# Patient Record
Sex: FEMALE | Race: WHITE | NOT HISPANIC OR LATINO | Employment: OTHER | URBAN - METROPOLITAN AREA
[De-identification: names, ages, dates, MRNs, and addresses within clinical notes are randomized per-mention and may not be internally consistent; named-entity substitution may affect disease eponyms.]

---

## 2023-03-18 PROBLEM — M79.89 SWELLING OF BOTH HANDS: Status: ACTIVE | Noted: 2023-03-18

## 2023-03-18 PROBLEM — R93.89 ABNORMAL FINDINGS ON DIAGNOSTIC IMAGING OF OTHER SPECIFIED BODY STRUCTURES: Status: ACTIVE | Noted: 2023-03-18

## 2023-03-18 PROBLEM — R93.1 AGATSTON CORONARY ARTERY CALCIUM SCORE BETWEEN 200 AND 399: Status: ACTIVE | Noted: 2023-03-18

## 2023-03-18 PROBLEM — E78.5 HYPERLIPIDEMIA: Status: ACTIVE | Noted: 2023-03-18

## 2023-03-18 PROBLEM — R35.0 URINARY FREQUENCY: Status: ACTIVE | Noted: 2023-03-18

## 2023-03-18 PROBLEM — M94.9 DISORDER OF BONE AND CARTILAGE: Status: ACTIVE | Noted: 2023-03-18

## 2023-03-18 PROBLEM — H04.129 DRY EYE: Status: ACTIVE | Noted: 2023-03-18

## 2023-03-18 PROBLEM — R09.89 FEMORAL BRUIT: Status: ACTIVE | Noted: 2023-03-18

## 2023-03-18 PROBLEM — E78.89 ELEVATED HIGH-DENSITY LIPOPROTEIN: Status: ACTIVE | Noted: 2023-03-18

## 2023-03-18 PROBLEM — R93.89 ABNORMAL CHEST X-RAY: Status: ACTIVE | Noted: 2023-03-18

## 2023-03-18 PROBLEM — G56.00 CARPAL TUNNEL SYNDROME: Status: ACTIVE | Noted: 2023-03-18

## 2023-03-18 PROBLEM — M89.9 DISORDER OF BONE AND CARTILAGE: Status: ACTIVE | Noted: 2023-03-18

## 2023-03-18 PROBLEM — M19.90 OSTEOARTHRITIS: Status: ACTIVE | Noted: 2023-03-18

## 2023-03-18 PROBLEM — R09.89 BILATERAL CAROTID BRUITS: Status: ACTIVE | Noted: 2023-03-18

## 2023-03-18 PROBLEM — R09.89 PALPABLE ABDOMINAL AORTA: Status: ACTIVE | Noted: 2023-03-18

## 2023-03-18 PROBLEM — H43.399 VITREOUS FLOATERS: Status: ACTIVE | Noted: 2023-03-18

## 2023-03-18 PROBLEM — M79.10 MYALGIA: Status: ACTIVE | Noted: 2023-03-18

## 2023-03-18 PROBLEM — G47.419 NARCOLEPSY (HHS-HCC): Status: ACTIVE | Noted: 2023-03-18

## 2023-03-18 PROBLEM — M81.0 OSTEOPOROSIS: Status: ACTIVE | Noted: 2023-03-18

## 2023-03-18 PROBLEM — H43.813 POSTERIOR VITREOUS DETACHMENT OF BOTH EYES: Status: ACTIVE | Noted: 2023-03-18

## 2023-03-18 PROBLEM — Z97.3 WEARS EYEGLASSES: Status: ACTIVE | Noted: 2023-03-18

## 2023-03-18 PROBLEM — H26.9 CATARACT: Status: ACTIVE | Noted: 2023-03-18

## 2023-03-18 PROBLEM — H25.813 MIXED TYPE AGE-RELATED CATARACT, BOTH EYES: Status: ACTIVE | Noted: 2023-03-18

## 2023-03-18 PROBLEM — M35.3 PMR (POLYMYALGIA RHEUMATICA) (MULTI): Status: ACTIVE | Noted: 2023-03-18

## 2023-03-18 PROBLEM — R09.1 PLEURISY: Status: ACTIVE | Noted: 2023-03-18

## 2023-03-18 PROBLEM — R92.8 ABNORMAL MAMMOGRAM: Status: ACTIVE | Noted: 2023-03-18

## 2023-03-18 PROBLEM — L25.9 CONTACT DERMATITIS: Status: ACTIVE | Noted: 2023-03-18

## 2023-03-18 PROBLEM — E67.3 HIGH VITAMIN D LEVEL: Status: ACTIVE | Noted: 2023-03-18

## 2023-03-18 PROBLEM — R09.89 LABILE BLOOD PRESSURE: Status: ACTIVE | Noted: 2023-03-18

## 2023-03-18 PROBLEM — I25.10 CORONARY ARTERY DISEASE WITHOUT ANGINA PECTORIS: Status: ACTIVE | Noted: 2023-03-18

## 2023-03-18 RX ORDER — ASCORBIC ACID 125 MG
100 TABLET,CHEWABLE ORAL
COMMUNITY
Start: 2021-03-19

## 2023-03-18 RX ORDER — EPINEPHRINE 0.22MG
AEROSOL WITH ADAPTER (ML) INHALATION
COMMUNITY
Start: 2014-10-23 | End: 2023-03-25 | Stop reason: DRUGHIGH

## 2023-03-18 RX ORDER — PREDNISONE 5 MG/1
1 TABLET ORAL DAILY
COMMUNITY
Start: 2022-07-18 | End: 2023-03-25 | Stop reason: DRUGHIGH

## 2023-03-18 RX ORDER — TETRACYCLINE HCL 500 MG
CAPSULE ORAL
COMMUNITY
Start: 2021-03-19

## 2023-03-18 RX ORDER — AMPICILLIN TRIHYDRATE 250 MG
CAPSULE ORAL
COMMUNITY
Start: 2021-03-19

## 2023-03-18 RX ORDER — VIT C/E/ZN/COPPR/LUTEIN/ZEAXAN 250MG-90MG
25 CAPSULE ORAL
COMMUNITY
Start: 2014-05-08 | End: 2023-03-25 | Stop reason: DRUGHIGH

## 2023-03-18 RX ORDER — METHYLPHENIDATE HYDROCHLORIDE 20 MG/1
1 TABLET ORAL 3 TIMES DAILY
COMMUNITY

## 2023-03-18 RX ORDER — IBUPROFEN 100 MG/5ML
1 SUSPENSION, ORAL (FINAL DOSE FORM) ORAL DAILY
COMMUNITY
Start: 2021-03-19

## 2023-03-18 RX ORDER — PREDNISONE 1 MG/1
3 TABLET ORAL DAILY
COMMUNITY
Start: 2021-06-18 | End: 2023-03-25 | Stop reason: DRUGHIGH

## 2023-03-18 RX ORDER — SODIUM OXYBATE 0.5 G/ML
SOLUTION ORAL
COMMUNITY
Start: 2013-11-01 | End: 2023-03-25 | Stop reason: DRUGHIGH

## 2023-03-18 RX ORDER — IBANDRONATE SODIUM 150 MG/1
1 TABLET, FILM COATED ORAL
COMMUNITY
Start: 2022-09-16 | End: 2024-02-26 | Stop reason: SDUPTHER

## 2023-03-18 RX ORDER — CALCIUM CARBONATE 600 MG
1 TABLET ORAL DAILY
COMMUNITY
Start: 2014-10-23

## 2023-03-23 ENCOUNTER — OFFICE VISIT (OUTPATIENT)
Dept: PRIMARY CARE | Facility: CLINIC | Age: 73
End: 2023-03-23
Payer: MEDICARE

## 2023-03-23 VITALS
SYSTOLIC BLOOD PRESSURE: 160 MMHG | DIASTOLIC BLOOD PRESSURE: 70 MMHG | RESPIRATION RATE: 14 BRPM | BODY MASS INDEX: 21.53 KG/M2 | HEIGHT: 66 IN | WEIGHT: 134 LBS | HEART RATE: 66 BPM

## 2023-03-23 DIAGNOSIS — R09.89 PALPABLE ABDOMINAL AORTA: ICD-10-CM

## 2023-03-23 DIAGNOSIS — R35.0 URINARY FREQUENCY: ICD-10-CM

## 2023-03-23 DIAGNOSIS — E78.89 ELEVATED HIGH-DENSITY LIPOPROTEIN: ICD-10-CM

## 2023-03-23 DIAGNOSIS — M81.0 OSTEOPOROSIS, UNSPECIFIED OSTEOPOROSIS TYPE, UNSPECIFIED PATHOLOGICAL FRACTURE PRESENCE: ICD-10-CM

## 2023-03-23 DIAGNOSIS — L25.9 CONTACT DERMATITIS, UNSPECIFIED CONTACT DERMATITIS TYPE, UNSPECIFIED TRIGGER: ICD-10-CM

## 2023-03-23 DIAGNOSIS — Z00.00 ROUTINE GENERAL MEDICAL EXAMINATION AT A HEALTH CARE FACILITY: ICD-10-CM

## 2023-03-23 DIAGNOSIS — R09.89 BILATERAL CAROTID BRUITS: ICD-10-CM

## 2023-03-23 DIAGNOSIS — I10 SYSTOLIC HYPERTENSION, ISOLATED: ICD-10-CM

## 2023-03-23 DIAGNOSIS — M35.3 PMR (POLYMYALGIA RHEUMATICA) (MULTI): ICD-10-CM

## 2023-03-23 DIAGNOSIS — R09.89 FEMORAL BRUIT: ICD-10-CM

## 2023-03-23 DIAGNOSIS — E78.5 HYPERLIPIDEMIA, UNSPECIFIED HYPERLIPIDEMIA TYPE: ICD-10-CM

## 2023-03-23 DIAGNOSIS — M19.91 PRIMARY OSTEOARTHRITIS, UNSPECIFIED SITE: ICD-10-CM

## 2023-03-23 DIAGNOSIS — G47.419 PRIMARY NARCOLEPSY WITHOUT CATAPLEXY (HHS-HCC): ICD-10-CM

## 2023-03-23 DIAGNOSIS — I25.10 CORONARY ARTERY DISEASE INVOLVING NATIVE CORONARY ARTERY OF NATIVE HEART WITHOUT ANGINA PECTORIS: Primary | ICD-10-CM

## 2023-03-23 LAB
ALANINE AMINOTRANSFERASE (SGPT) (U/L) IN SER/PLAS: 14 U/L (ref 7–45)
ALBUMIN (G/DL) IN SER/PLAS: 4.4 G/DL (ref 3.4–5)
ANION GAP IN SER/PLAS: 14 MMOL/L (ref 10–20)
ASPARTATE AMINOTRANSFERASE (SGOT) (U/L) IN SER/PLAS: 16 U/L (ref 9–39)
CALCIUM (MG/DL) IN SER/PLAS: 10.3 MG/DL (ref 8.6–10.6)
CARBON DIOXIDE, TOTAL (MMOL/L) IN SER/PLAS: 31 MMOL/L (ref 21–32)
CHLORIDE (MMOL/L) IN SER/PLAS: 101 MMOL/L (ref 98–107)
CHOLESTEROL (MG/DL) IN SER/PLAS: 245 MG/DL (ref 0–199)
CHOLESTEROL IN HDL (MG/DL) IN SER/PLAS: 94.5 MG/DL
CHOLESTEROL/HDL RATIO: 2.6
CREATININE (MG/DL) IN SER/PLAS: 0.69 MG/DL (ref 0.5–1.05)
GFR FEMALE: >90 ML/MIN/1.73M2
GLUCOSE (MG/DL) IN SER/PLAS: 96 MG/DL (ref 74–99)
LDL: 135 MG/DL (ref 0–99)
PHOSPHATE (MG/DL) IN SER/PLAS: 4.4 MG/DL (ref 2.5–4.9)
POTASSIUM (MMOL/L) IN SER/PLAS: 4.7 MMOL/L (ref 3.5–5.3)
SODIUM (MMOL/L) IN SER/PLAS: 141 MMOL/L (ref 136–145)
TRIGLYCERIDE (MG/DL) IN SER/PLAS: 80 MG/DL (ref 0–149)
UREA NITROGEN (MG/DL) IN SER/PLAS: 19 MG/DL (ref 6–23)
VLDL: 16 MG/DL (ref 0–40)

## 2023-03-23 PROCEDURE — 1157F ADVNC CARE PLAN IN RCRD: CPT | Performed by: INTERNAL MEDICINE

## 2023-03-23 PROCEDURE — 3008F BODY MASS INDEX DOCD: CPT | Performed by: INTERNAL MEDICINE

## 2023-03-23 PROCEDURE — 84443 ASSAY THYROID STIM HORMONE: CPT

## 2023-03-23 PROCEDURE — 1159F MED LIST DOCD IN RCRD: CPT | Performed by: INTERNAL MEDICINE

## 2023-03-23 PROCEDURE — 93000 ELECTROCARDIOGRAM COMPLETE: CPT | Performed by: INTERNAL MEDICINE

## 2023-03-23 PROCEDURE — 82306 VITAMIN D 25 HYDROXY: CPT

## 2023-03-23 PROCEDURE — 1170F FXNL STATUS ASSESSED: CPT | Performed by: INTERNAL MEDICINE

## 2023-03-23 PROCEDURE — 99397 PER PM REEVAL EST PAT 65+ YR: CPT | Performed by: INTERNAL MEDICINE

## 2023-03-23 PROCEDURE — 3077F SYST BP >= 140 MM HG: CPT | Performed by: INTERNAL MEDICINE

## 2023-03-23 PROCEDURE — 3078F DIAST BP <80 MM HG: CPT | Performed by: INTERNAL MEDICINE

## 2023-03-23 PROCEDURE — 1036F TOBACCO NON-USER: CPT | Performed by: INTERNAL MEDICINE

## 2023-03-23 PROCEDURE — 80061 LIPID PANEL: CPT

## 2023-03-23 PROCEDURE — 1160F RVW MEDS BY RX/DR IN RCRD: CPT | Performed by: INTERNAL MEDICINE

## 2023-03-23 PROCEDURE — 36415 COLL VENOUS BLD VENIPUNCTURE: CPT | Performed by: INTERNAL MEDICINE

## 2023-03-23 PROCEDURE — 84450 TRANSFERASE (AST) (SGOT): CPT

## 2023-03-23 PROCEDURE — 84460 ALANINE AMINO (ALT) (SGPT): CPT

## 2023-03-23 PROCEDURE — 99214 OFFICE O/P EST MOD 30 MIN: CPT | Performed by: INTERNAL MEDICINE

## 2023-03-23 PROCEDURE — 80069 RENAL FUNCTION PANEL: CPT

## 2023-03-24 LAB
CALCIDIOL (25 OH VITAMIN D3) (NG/ML) IN SER/PLAS: 77 NG/ML
THYROTROPIN (MIU/L) IN SER/PLAS BY DETECTION LIMIT <= 0.05 MIU/L: 1.58 MIU/L (ref 0.44–3.98)

## 2023-03-25 PROBLEM — I10 SYSTOLIC HYPERTENSION, ISOLATED: Status: ACTIVE | Noted: 2023-03-25

## 2023-03-25 PROBLEM — Z00.00 ROUTINE GENERAL MEDICAL EXAMINATION AT A HEALTH CARE FACILITY: Status: ACTIVE | Noted: 2023-03-25

## 2023-03-25 PROBLEM — R93.1 AGATSTON CORONARY ARTERY CALCIUM SCORE BETWEEN 200 AND 399: Status: RESOLVED | Noted: 2023-03-18 | Resolved: 2023-03-25

## 2023-03-25 RX ORDER — EPINEPHRINE 0.22MG
1 AEROSOL WITH ADAPTER (ML) INHALATION
COMMUNITY
Start: 2011-05-23

## 2023-03-25 RX ORDER — SODIUM OXYBATE 0.5 G/ML
2.25 SOLUTION ORAL
COMMUNITY
Start: 2017-09-01

## 2023-03-25 RX ORDER — VIT C/E/ZN/COPPR/LUTEIN/ZEAXAN 250MG-90MG
1000 CAPSULE ORAL
COMMUNITY

## 2023-03-25 ASSESSMENT — ENCOUNTER SYMPTOMS
CONSTITUTIONAL NEGATIVE: 1
FREQUENCY: 1
HEMATOLOGIC/LYMPHATIC NEGATIVE: 1
RESPIRATORY NEGATIVE: 1
NEUROLOGICAL NEGATIVE: 1
GASTROINTESTINAL NEGATIVE: 1
CARDIOVASCULAR NEGATIVE: 1
EYES NEGATIVE: 1
DYSPHORIC MOOD: 1
MUSCULOSKELETAL NEGATIVE: 1

## 2023-03-25 ASSESSMENT — ACTIVITIES OF DAILY LIVING (ADL)
DRESSING: INDEPENDENT
MANAGING_FINANCES: INDEPENDENT
GROCERY_SHOPPING: INDEPENDENT
DOING_HOUSEWORK: INDEPENDENT
BATHING: INDEPENDENT
TAKING_MEDICATION: INDEPENDENT

## 2023-03-25 ASSESSMENT — PATIENT HEALTH QUESTIONNAIRE - PHQ9
1. LITTLE INTEREST OR PLEASURE IN DOING THINGS: NOT AT ALL
SUM OF ALL RESPONSES TO PHQ9 QUESTIONS 1 AND 2: 0
2. FEELING DOWN, DEPRESSED OR HOPELESS: NOT AT ALL

## 2023-03-25 NOTE — PROGRESS NOTES
Subjective   Patient ID: Nahomi Velasquez is a 72 y.o. female who presents for Medicare Annual Wellness Visit Subsequent.  HPI  Overall she feels well  Last general health evaluation February 2022  Rheumatology consultation now 3 mg prednisone daily  Active lifestyle, Silver sneakers  Knee pain transiently after tour  Reports interval blood pressure less than 140/90  History diverticulosis, pneumonia, narcolepsy  Nulligravida  Review of Systems   Constitutional: Negative.         6 hours sleep, weight 125 pounds   HENT: Negative.  Negative for dental problem.         Dentist twice a year   Eyes: Negative.         Ophthalmology consult July 2022   Respiratory: Negative.     Cardiovascular: Negative.    Gastrointestinal: Negative.    Genitourinary:  Positive for frequency.        Nocturia; GYN consult 2022   Musculoskeletal: Negative.    Skin: Negative.         Remote dermatology consult   Allergic/Immunologic: Positive for environmental allergies and immunocompromised state.        Multiple environmental allergies   Neurological: Negative.    Hematological: Negative.    Psychiatric/Behavioral:  Positive for dysphoric mood.         94-year-old neighbor friend required assisted living/hospice care       Objective   Physical Exam  Constitutional:       Appearance: Normal appearance. She is normal weight.   HENT:      Head: Normocephalic.      Right Ear: Tympanic membrane and ear canal normal.      Left Ear: Tympanic membrane and ear canal normal.      Nose: Nose normal.      Mouth/Throat:      Mouth: Mucous membranes are moist.      Pharynx: Oropharynx is clear.   Eyes:      General: No scleral icterus.     Extraocular Movements: Extraocular movements intact.      Conjunctiva/sclera: Conjunctivae normal.   Neck:      Vascular: Carotid bruit present.   Cardiovascular:      Rate and Rhythm: Normal rate and regular rhythm.      Pulses: Normal pulses.      Heart sounds: Normal heart sounds.   Pulmonary:      Effort: Pulmonary  "effort is normal.      Breath sounds: Normal breath sounds.   Chest:   Breasts:     Right: Normal.      Left: Normal.   Abdominal:      General: Abdomen is flat. Bowel sounds are normal.      Palpations: Abdomen is soft.      Tenderness: There is no abdominal tenderness.      Comments: Palpable aorta with bruit   Musculoskeletal:         General: Normal range of motion.      Cervical back: Normal range of motion and neck supple.   Lymphadenopathy:      Upper Body:      Right upper body: No axillary adenopathy.      Left upper body: No axillary adenopathy.   Skin:     General: Skin is warm and dry.   Neurological:      General: No focal deficit present.      Mental Status: She is alert and oriented to person, place, and time. Mental status is at baseline.   Psychiatric:         Mood and Affect: Mood normal.         Behavior: Behavior normal.       /70 (BP Location: Right arm, Patient Position: Sitting)   Pulse 66   Resp 14   Ht 1.664 m (5' 5.5\")   Wt 60.8 kg (134 lb)   BMI 21.96 kg/m²   Waist circumference 29 inches    Data  ECG normal sinus rhythm  Colonoscopy 12/19/2011, 2017  GI consult 2/25/2022  Fit test - 10/24/2022 negative  GYN Dr. Cherry 11/14/2022  Dr. Adams 10/17/2022  Lab satisfactory with elevated HDL   Bone densitometry 8/8/2022 normal with 8.5% increase  Mammogram 10/27/2022  Coronary artery calcium CT score 2016 equals 284, 262 in the LAD  Echocardiogram 4/27/2016-normal  Peripheral vascular resistance/ankle-brachial index 3/4/2022 normal    Assessment/Plan     1 overall you are in good health today age and gender appropriate wellness services reviewed  2 coronary artery disease without angina pectoris  High coronary artery calcium CT score, recommend reevaluate with stress echocardiogram, CT angiogram, or cardiology consultation  Elects stress echocardiogram  Check abdominal ultrasound for aneurysm  Electrocardiogram shows high voltages  Peripheral arterial circulation satisfactory  High " HDL cholesterol  Labile blood pressure  Cardiac risk counseling provided 20 minutes  3 cancer screening and prevention age and gender appropriate up-to-date  4 immunizations reviewed, satisfactory  5 osteoporosis screening and prevention-improved, continue current therapy  Hyperparathyroidism inactive  6 narcolepsy-stable on current therapy  7 PMR-doing well on reduced steroids  Problem List Items Addressed This Visit          Nervous    Narcolepsy    PMR (polymyalgia rheumatica) (CMS/HCC)       Circulatory    Bilateral carotid bruits    Coronary artery disease without angina pectoris - Primary    Relevant Orders    Echocardiogram stress test    Femoral bruit    Palpable abdominal aorta    Relevant Orders    Vascular US aorta iliac duplex complete    Systolic hypertension, isolated    Relevant Orders    ECG 12 lead    Renal Function Panel (Completed)    TSH with reflex to Free T4 if abnormal (Completed)    CBC and Auto Differential       Musculoskeletal    Osteoarthritis    Osteoporosis    Relevant Orders    Vitamin D, Total (Completed)       Infectious/Inflammatory    Contact dermatitis       Other    Elevated high-density lipoprotein    Relevant Orders    Lipid Panel (Completed)    Alanine Aminotransferase (Completed)    Aspartate Aminotransferase (Completed)    CBC and Auto Differential    Body mass index (BMI) of 21.0 to 21.9 in adult    Hyperlipidemia    Urinary frequency    Routine general medical examination at a health care facility    Relevant Orders    TSH with reflex to Free T4 if abnormal (Completed)    Urinalysis with Reflex Microscopic       Time Spent  Prep time on day of patient encounter: 5 minutes  Time spent directly with patient, family or caregiver: 70 minutes  Additional Time Spent on Patient Care Activities: 0 minutes (Reviewed [facility] notes, comprehensive  into new format)  Documentation Time: 20 minutes  Other Time Spent: 0 minutes (Retrieval and update [facility] notes, new format data  entry)

## 2023-04-03 ENCOUNTER — APPOINTMENT (OUTPATIENT)
Dept: PRIMARY CARE | Facility: CLINIC | Age: 73
End: 2023-04-03
Payer: MEDICARE

## 2023-04-18 LAB — C REACTIVE PROTEIN (MG/L) IN SER/PLAS: <0.1 MG/DL

## 2023-04-19 ENCOUNTER — TELEPHONE (OUTPATIENT)
Dept: PRIMARY CARE | Facility: CLINIC | Age: 73
End: 2023-04-19
Payer: MEDICARE

## 2023-04-19 NOTE — TELEPHONE ENCOUNTER
Calling in confusion as to why her echo cardiogram is not authorized and not allowed by her insurance, she is asking if anything was heard regarding this. As she should be able to get this one done. She has her US coming iup Friday but after speaking with her insurance they told her that its not authorized by the Dr. Ayala wanted to ask if we knew anything, I have not, inquiring to PCP. Please advise, thank you

## 2023-04-21 NOTE — TELEPHONE ENCOUNTER
Had called the pre cert dept. And they said they would resubmit, they called back and they can not resubmit per the insurance, needs a peer to peer or chage of test. Please advise, if the peer to peer can be done or a changes as indication on the form, thank you

## 2023-05-01 ENCOUNTER — TELEPHONE (OUTPATIENT)
Dept: PRIMARY CARE | Facility: CLINIC | Age: 73
End: 2023-05-01

## 2023-06-09 ENCOUNTER — TELEPHONE (OUTPATIENT)
Dept: PRIMARY CARE | Facility: CLINIC | Age: 73
End: 2023-06-09

## 2023-06-09 NOTE — TELEPHONE ENCOUNTER
Looking for results of US of aorta and wanted to know why her stress karen was cancelled or does she need to do it as well? Please review and advise, thankdakota

## 2023-06-10 DIAGNOSIS — I10 SYSTOLIC HYPERTENSION, ISOLATED: ICD-10-CM

## 2023-06-10 DIAGNOSIS — E78.5 HYPERLIPIDEMIA, UNSPECIFIED HYPERLIPIDEMIA TYPE: ICD-10-CM

## 2023-06-10 DIAGNOSIS — I25.10 CORONARY ARTERY DISEASE INVOLVING NATIVE CORONARY ARTERY OF NATIVE HEART WITHOUT ANGINA PECTORIS: Primary | ICD-10-CM

## 2023-07-18 LAB — C REACTIVE PROTEIN (MG/L) IN SER/PLAS: 0.14 MG/DL

## 2023-09-18 PROBLEM — S01.81XA FACIAL LACERATION: Status: ACTIVE | Noted: 2023-09-18

## 2023-09-18 RX ORDER — CLOBETASOL PROPIONATE 0.46 MG/ML
1 SOLUTION TOPICAL 2 TIMES DAILY
COMMUNITY
Start: 2016-05-23 | End: 2023-11-09 | Stop reason: ALTCHOICE

## 2023-09-18 RX ORDER — NIACIN (INOSITOL NIACINATE) 400(500MG)
CAPSULE ORAL
COMMUNITY
Start: 2014-10-23 | End: 2023-11-28 | Stop reason: ALTCHOICE

## 2023-09-18 RX ORDER — OMEGA-3 FATTY ACIDS 1000 MG
2000 CAPSULE ORAL 2 TIMES DAILY
COMMUNITY
Start: 2011-05-23

## 2023-10-18 ENCOUNTER — LAB (OUTPATIENT)
Dept: LAB | Facility: LAB | Age: 73
End: 2023-10-18
Payer: MEDICARE

## 2023-10-18 ENCOUNTER — OFFICE VISIT (OUTPATIENT)
Dept: RHEUMATOLOGY | Facility: CLINIC | Age: 73
End: 2023-10-18
Payer: MEDICARE

## 2023-10-18 ENCOUNTER — TELEPHONE (OUTPATIENT)
Dept: VASCULAR MEDICINE | Facility: CLINIC | Age: 73
End: 2023-10-18

## 2023-10-18 VITALS — BODY MASS INDEX: 22.62 KG/M2 | WEIGHT: 138 LBS | DIASTOLIC BLOOD PRESSURE: 60 MMHG | SYSTOLIC BLOOD PRESSURE: 118 MMHG

## 2023-10-18 DIAGNOSIS — M35.3 PMR (POLYMYALGIA RHEUMATICA) (MULTI): Primary | ICD-10-CM

## 2023-10-18 DIAGNOSIS — M81.0 OSTEOPOROSIS, UNSPECIFIED OSTEOPOROSIS TYPE, UNSPECIFIED PATHOLOGICAL FRACTURE PRESENCE: ICD-10-CM

## 2023-10-18 DIAGNOSIS — M19.91 PRIMARY OSTEOARTHRITIS, UNSPECIFIED SITE: ICD-10-CM

## 2023-10-18 DIAGNOSIS — M35.3 PMR (POLYMYALGIA RHEUMATICA) (MULTI): ICD-10-CM

## 2023-10-18 LAB — CRP SERPL-MCNC: <0.1 MG/DL

## 2023-10-18 PROCEDURE — 1160F RVW MEDS BY RX/DR IN RCRD: CPT | Performed by: INTERNAL MEDICINE

## 2023-10-18 PROCEDURE — 1159F MED LIST DOCD IN RCRD: CPT | Performed by: INTERNAL MEDICINE

## 2023-10-18 PROCEDURE — 36415 COLL VENOUS BLD VENIPUNCTURE: CPT

## 2023-10-18 PROCEDURE — 99214 OFFICE O/P EST MOD 30 MIN: CPT | Performed by: INTERNAL MEDICINE

## 2023-10-18 PROCEDURE — 3008F BODY MASS INDEX DOCD: CPT | Performed by: INTERNAL MEDICINE

## 2023-10-18 PROCEDURE — 1126F AMNT PAIN NOTED NONE PRSNT: CPT | Performed by: INTERNAL MEDICINE

## 2023-10-18 PROCEDURE — 3074F SYST BP LT 130 MM HG: CPT | Performed by: INTERNAL MEDICINE

## 2023-10-18 PROCEDURE — 86140 C-REACTIVE PROTEIN: CPT

## 2023-10-18 PROCEDURE — 1036F TOBACCO NON-USER: CPT | Performed by: INTERNAL MEDICINE

## 2023-10-18 PROCEDURE — 3078F DIAST BP <80 MM HG: CPT | Performed by: INTERNAL MEDICINE

## 2023-10-18 NOTE — PROGRESS NOTES
Recheck  OA  /  PMR  Current dose of prednisone 1 mg daily and doing well         Her hands hurt in AM, but once she starts using them, she feels better.  No other pain.  AM stiffness in hands only for 1.5 hr.  No CP, resp, or GI.  No HA, tongue/jaw john, or visual changes    PE  NAD  Good TA pulses, NT, no head/neck bruits  RRR no r/m/g  CTA  2+ DP, PT, and rad pulses  No edema  No synovitis    A/P - OA and PMR - doing well.  Check CRP.  Discontinue pred and call if sx recur  OP - on ibandronate.  Check DEXA 7/25 for poss drug holiday  Reeval 3 mo

## 2023-10-20 ENCOUNTER — OFFICE VISIT (OUTPATIENT)
Dept: PRIMARY CARE | Facility: CLINIC | Age: 73
End: 2023-10-20
Payer: MEDICARE

## 2023-10-20 DIAGNOSIS — E78.89 ELEVATED HIGH-DENSITY LIPOPROTEIN: ICD-10-CM

## 2023-10-20 DIAGNOSIS — I25.10 CORONARY ARTERY DISEASE INVOLVING NATIVE CORONARY ARTERY OF NATIVE HEART WITHOUT ANGINA PECTORIS: ICD-10-CM

## 2023-10-20 DIAGNOSIS — Z23 ENCOUNTER FOR IMMUNIZATION: ICD-10-CM

## 2023-10-20 DIAGNOSIS — I10 SYSTOLIC HYPERTENSION, ISOLATED: Primary | ICD-10-CM

## 2023-10-20 PROCEDURE — G0009 ADMIN PNEUMOCOCCAL VACCINE: HCPCS | Performed by: INTERNAL MEDICINE

## 2023-10-20 PROCEDURE — 3078F DIAST BP <80 MM HG: CPT | Performed by: INTERNAL MEDICINE

## 2023-10-20 PROCEDURE — 1126F AMNT PAIN NOTED NONE PRSNT: CPT | Performed by: INTERNAL MEDICINE

## 2023-10-20 PROCEDURE — 3075F SYST BP GE 130 - 139MM HG: CPT | Performed by: INTERNAL MEDICINE

## 2023-10-20 PROCEDURE — 99214 OFFICE O/P EST MOD 30 MIN: CPT | Performed by: INTERNAL MEDICINE

## 2023-10-20 PROCEDURE — G0008 ADMIN INFLUENZA VIRUS VAC: HCPCS | Performed by: INTERNAL MEDICINE

## 2023-10-20 PROCEDURE — 1036F TOBACCO NON-USER: CPT | Performed by: INTERNAL MEDICINE

## 2023-10-20 PROCEDURE — 3008F BODY MASS INDEX DOCD: CPT | Performed by: INTERNAL MEDICINE

## 2023-10-20 PROCEDURE — 1159F MED LIST DOCD IN RCRD: CPT | Performed by: INTERNAL MEDICINE

## 2023-10-20 PROCEDURE — 90662 IIV NO PRSV INCREASED AG IM: CPT | Performed by: INTERNAL MEDICINE

## 2023-10-20 PROCEDURE — 1160F RVW MEDS BY RX/DR IN RCRD: CPT | Performed by: INTERNAL MEDICINE

## 2023-10-20 PROCEDURE — 90677 PCV20 VACCINE IM: CPT | Performed by: INTERNAL MEDICINE

## 2023-10-27 ENCOUNTER — HOSPITAL ENCOUNTER (OUTPATIENT)
Dept: RADIOLOGY | Facility: HOSPITAL | Age: 73
Discharge: HOME | End: 2023-10-27
Payer: MEDICARE

## 2023-10-27 VITALS — HEIGHT: 66 IN | WEIGHT: 137 LBS | BODY MASS INDEX: 22.02 KG/M2

## 2023-10-27 DIAGNOSIS — Z12.39 ENCOUNTER FOR OTHER SCREENING FOR MALIGNANT NEOPLASM OF BREAST: ICD-10-CM

## 2023-10-27 DIAGNOSIS — Z12.31 ENCOUNTER FOR SCREENING MAMMOGRAM FOR MALIGNANT NEOPLASM OF BREAST: ICD-10-CM

## 2023-10-27 PROCEDURE — 77063 BREAST TOMOSYNTHESIS BI: CPT

## 2023-10-27 PROCEDURE — 77067 SCR MAMMO BI INCL CAD: CPT

## 2023-10-30 NOTE — TELEPHONE ENCOUNTER
Result Communication    No results found from the In Basket message.    10:59 AM      Results were successfully communicated with the patient and they acknowledged their understanding.

## 2023-11-10 NOTE — PROGRESS NOTES
Subjective   Patient ID: Nahomi Velasquez is a 72 y.o. female who presents for Follow-up and Flu Vaccine.  HPI  Review test results    Review of Systems   Respiratory: Negative.     Cardiovascular: Negative.    Neurological: Negative.      Objective   Physical Exam  Constitutional:       Appearance: Normal appearance.   Cardiovascular:      Pulses: Normal pulses.      Heart sounds: Normal heart sounds.   Pulmonary:      Effort: Pulmonary effort is normal.      Breath sounds: Normal breath sounds.   Neurological:      General: No focal deficit present.      Mental Status: She is alert.       /78   Pulse 72   Resp 16     Rheumatology follow-up 10/18  CRP less than 0.1  Fit test 9/4 negative  Stress echocardiogram 7/10/2023 normal  Lipid panel March 2023 elevated HDL cholesterol  Ultrasound aorta 4/21-no abdominal aortic aneurysm  Carotid ultrasound 2/28/2022-less than 50% stenosis  CT coronary artery calcium score May 2016 equals 284  Pulmonary consult narcolepsy/cataplexy 6/7/2023 reviewed doing well on therapy    Assessment/Plan     Overall doing well  Satisfactory/normal testing as above  Administer flu shot and pneumococcal vaccine Prevnar 20  Problem List Items Addressed This Visit       Coronary artery disease without angina pectoris    Elevated high-density lipoprotein    Systolic hypertension, isolated - Primary    Encounter for immunization

## 2023-11-15 VITALS — SYSTOLIC BLOOD PRESSURE: 136 MMHG | RESPIRATION RATE: 16 BRPM | HEART RATE: 72 BPM | DIASTOLIC BLOOD PRESSURE: 78 MMHG

## 2023-11-15 PROBLEM — Z23 ENCOUNTER FOR IMMUNIZATION: Status: ACTIVE | Noted: 2023-11-15

## 2023-11-15 ASSESSMENT — ENCOUNTER SYMPTOMS
CARDIOVASCULAR NEGATIVE: 1
RESPIRATORY NEGATIVE: 1
NEUROLOGICAL NEGATIVE: 1

## 2023-11-27 ENCOUNTER — TELEPHONE (OUTPATIENT)
Dept: RHEUMATOLOGY | Facility: CLINIC | Age: 73
End: 2023-11-27
Payer: MEDICARE

## 2023-11-27 NOTE — TELEPHONE ENCOUNTER
"C/O knee pain with steps since knee \" gave out \" x 1 week ago.  Should I go back on prednisone, or is this my arthritis and what should I do?  "

## 2023-11-28 ENCOUNTER — ANCILLARY PROCEDURE (OUTPATIENT)
Dept: RADIOLOGY | Facility: CLINIC | Age: 73
End: 2023-11-28
Payer: MEDICARE

## 2023-11-28 ENCOUNTER — OFFICE VISIT (OUTPATIENT)
Dept: RHEUMATOLOGY | Facility: CLINIC | Age: 73
End: 2023-11-28
Payer: MEDICARE

## 2023-11-28 VITALS — DIASTOLIC BLOOD PRESSURE: 78 MMHG | SYSTOLIC BLOOD PRESSURE: 180 MMHG

## 2023-11-28 DIAGNOSIS — M35.3 PMR (POLYMYALGIA RHEUMATICA) (MULTI): ICD-10-CM

## 2023-11-28 DIAGNOSIS — M19.91 PRIMARY OSTEOARTHRITIS, UNSPECIFIED SITE: ICD-10-CM

## 2023-11-28 DIAGNOSIS — M25.562 LEFT KNEE PAIN, UNSPECIFIED CHRONICITY: Primary | ICD-10-CM

## 2023-11-28 DIAGNOSIS — M25.562 LEFT KNEE PAIN, UNSPECIFIED CHRONICITY: ICD-10-CM

## 2023-11-28 PROCEDURE — 3078F DIAST BP <80 MM HG: CPT | Performed by: INTERNAL MEDICINE

## 2023-11-28 PROCEDURE — 3008F BODY MASS INDEX DOCD: CPT | Performed by: INTERNAL MEDICINE

## 2023-11-28 PROCEDURE — 2500000004 HC RX 250 GENERAL PHARMACY W/ HCPCS (ALT 636 FOR OP/ED): Performed by: INTERNAL MEDICINE

## 2023-11-28 PROCEDURE — 99213 OFFICE O/P EST LOW 20 MIN: CPT | Performed by: INTERNAL MEDICINE

## 2023-11-28 PROCEDURE — 1036F TOBACCO NON-USER: CPT | Performed by: INTERNAL MEDICINE

## 2023-11-28 PROCEDURE — 73562 X-RAY EXAM OF KNEE 3: CPT | Mod: BILATERAL PROCEDURE | Performed by: RADIOLOGY

## 2023-11-28 PROCEDURE — 1126F AMNT PAIN NOTED NONE PRSNT: CPT | Performed by: INTERNAL MEDICINE

## 2023-11-28 PROCEDURE — 3077F SYST BP >= 140 MM HG: CPT | Performed by: INTERNAL MEDICINE

## 2023-11-28 PROCEDURE — 1159F MED LIST DOCD IN RCRD: CPT | Performed by: INTERNAL MEDICINE

## 2023-11-28 PROCEDURE — 1160F RVW MEDS BY RX/DR IN RCRD: CPT | Performed by: INTERNAL MEDICINE

## 2023-11-28 PROCEDURE — 73562 X-RAY EXAM OF KNEE 3: CPT | Mod: 50

## 2023-11-28 PROCEDURE — 20610 DRAIN/INJ JOINT/BURSA W/O US: CPT | Performed by: INTERNAL MEDICINE

## 2023-11-28 RX ORDER — TRIAMCINOLONE ACETONIDE 40 MG/ML
2.5 INJECTION, SUSPENSION INTRA-ARTICULAR; INTRAMUSCULAR
Status: COMPLETED | OUTPATIENT
Start: 2023-11-28 | End: 2023-11-28

## 2023-11-28 RX ADMIN — TRIAMCINOLONE ACETONIDE 2.5 MG: 40 INJECTION, SUSPENSION INTRA-ARTICULAR; INTRAMUSCULAR at 14:15

## 2023-12-04 ENCOUNTER — EVALUATION (OUTPATIENT)
Dept: PHYSICAL THERAPY | Facility: CLINIC | Age: 73
End: 2023-12-04
Payer: MEDICARE

## 2023-12-04 DIAGNOSIS — M25.562 LEFT KNEE PAIN, UNSPECIFIED CHRONICITY: ICD-10-CM

## 2023-12-04 PROCEDURE — 97161 PT EVAL LOW COMPLEX 20 MIN: CPT | Mod: GP | Performed by: PHYSICAL THERAPIST

## 2023-12-04 ASSESSMENT — PAIN - FUNCTIONAL ASSESSMENT: PAIN_FUNCTIONAL_ASSESSMENT: 0-10

## 2023-12-04 ASSESSMENT — PAIN SCALES - GENERAL: PAINLEVEL_OUTOF10: 2

## 2023-12-04 NOTE — PROGRESS NOTES
Physical Therapy Evaluation and Treatment      Patient Name: Nahomi Velasquez  MRN: [id number]  Today's Date: 12/4/2023  Time Calculation  Start Time: 1430  Stop Time: 1455  Time Calculation (min): 25 min  Low complexity due to patient's clinical presentation being stable and uncomplicated by any significant comorbidities that may affect rehab tolerance and progression.    Current Problem:   1. Left knee pain, unspecified chronicity  Referral to Physical Therapy    Follow Up In Physical Therapy          Subjective    General:  General  General Comment: Pt reports a couple weeks ago she got up from the breakfast table and her L knee gave out of her. She never had any previous problems but afterwards there was incresaed weakness and large difficulty with stairs. She went to see her MD who gave her a cortisone shot which helped significantly. She is no longer having any problems with the stairs as she often has to do them during the day. She is no longer having any large discomfort but feels signifcantly better. She still has some weakness especailly after going up and down the steps at home.  Precautions:     Pain:  Pain Assessment  Pain Assessment: 0-10  Pain Score: 2  Home Living:     Prior Level of Function:  Prior Function Per Pt/Caregiver Report  Level of [place]: Independent with ADLs and functional transfers    Objective   Functional Assessments:  Stairs Comment: Reduction in eccentric control during stair negotiation    HIP  Lumbar AROM WFL unless documented below  Lumbar AROM WFL: yes    Specific Lower Extremity MMT WFL unless documented below  L Iliopsoas: (5/5): 5  L Gluteals (sidelying): (5/5): 4+  L Hip External Rotation: (5/5): 4+    Gait  Gait Comment: Normalized      and KNEE    Knee Palpation/Joint Mobility Assessment  Palpation/Joint Mobility Comment: No complaints of tenderness during palpation;  Knee AROM WFL unless documented below  L knee flexion: (140°): 130  L knee extension: (0°): 0  Knee PROM  WFL unless documented below  Knee PROM WFL: yes  Knee MMT WFL unless documented below  Knee MMT WFL: yes    Special Tests Negative unless documented below  Special Tests Negative: yes    Outcome Measures:  Other Measures  Lower Extremity Funtional Score (LEFS): 59     Treatments:  Therapeutic Exercise  Therapeutic Exercise Performed: Yes  Therapeutic Exercise Activity 1: Access Code [email or web address]  - Sit to Stand  - 1 x daily - 7 x weekly - 3 sets - 10 reps  - Lateral Step Up  - 1 x daily - 7 x weekly - 3 sets - 10 reps    Assessment   Assessment:  PT Assessment Results: Decreased strength  Rehab Prognosis: Excellent  Assessment Comment: Pt is a 73 y.o female presenting to [facility] with acute L knee pain. Pt has full ROM and strength throughout L knee however noted weakness within L hip abductors and external rotators creating reduction in stability. Also noted reduction in eccentric control suggestive of furhter reduction in functional stability. At this time pt would benefit from skilled physical therapy in order to improve knee stability and prevent further functional decline.    Plan:  Treatment/Interventions: Education/ Instruction, Dry needling, Gait training, Manual therapy, Neuromuscular re-education, Taping techniques, Therapeutic activities, Therapeutic exercises  PT Plan: Skilled PT  PT Frequency: 1 time per week  Duration: 4 weeks  Rehab Potential: Excellent    OP EDUCATION:  Outpatient Education  Individual(s) Educated: Patient  Risk and Benefits Discussed with Patient/Caregiver/Other: yes  Patient/Caregiver Demonstrated Understanding: yes  Plan of Care Discussed and Agreed Upon: yes  Patient Response to Education: Patient/Caregiver Verbalized Understanding of Information    Goals:  Active       PT Problem       Pt will be 100% IND with HEP in 4 weeks in order to maintain progress with therapy.         Start:  12/04/23    Expected End:  01/03/24            Pt will demosntrated normalized eccentric control  during descending stair negotiation in 4 weeks for home ambulation needs.       Start:  12/04/23    Expected End:  01/03/24            Pt will demonstrate subjective improvement of ADLs and recreational activities through improved score of 70 on LEFS in 4 weeks for functional activities at home..        Start:  12/04/23    Expected End:  01/03/24

## 2023-12-11 ENCOUNTER — TREATMENT (OUTPATIENT)
Dept: PHYSICAL THERAPY | Facility: CLINIC | Age: 73
End: 2023-12-11
Payer: MEDICARE

## 2023-12-11 DIAGNOSIS — M25.562 LEFT KNEE PAIN, UNSPECIFIED CHRONICITY: ICD-10-CM

## 2023-12-11 PROCEDURE — 97110 THERAPEUTIC EXERCISES: CPT | Mod: GP | Performed by: PHYSICAL THERAPIST

## 2023-12-11 ASSESSMENT — PAIN - FUNCTIONAL ASSESSMENT: PAIN_FUNCTIONAL_ASSESSMENT: 0-10

## 2023-12-11 ASSESSMENT — PAIN SCALES - GENERAL: PAINLEVEL_OUTOF10: 0 - NO PAIN

## 2023-12-11 NOTE — PROGRESS NOTES
"Physical Therapy Treatment    Patient Name: Nahomi Velasquez  MRN: [id number]  Today's Date: 12/11/2023  Time Calculation  Start Time: 1012  Stop Time: 1052  Time Calculation (min): 40 min  PT Therapeutic Procedures Time Entry  Therapeutic Exercise Time Entry: 40  Visit # 2/4    Current Problem   1. Left knee pain, unspecified chronicity  Follow Up In Physical Therapy          Subjective   General   General Comment: Pt reports increased fatigue after [place]. Does not report knee pain but more weakness.  Precautions:     Pain   Pain Assessment: 0-10  Pain Score: 0 - No pain  Post Treatment Pain Level 0    Objective   ER hip compensation with slight knee valgus    Treatments:  Therapeutic Exercise  Therapeutic Exercise Performed: Yes  Therapeutic Exercise Activity 1: Access Code [email or web address]  Therapeutic Exercise Activity 2: NuStep L3 5' no UE  Therapeutic Exercise Activity 3: fwd step up @ step 10x3  Therapeutic Exercise Activity 4: lateral step up @ step 10x3  Therapeutic Exercise Activity 5: side step BTB @ calf 3 laps  Therapeutic Exercise Activity 6: zig zag BTB @ calf 3 laps  Therapeutic Exercise Activity 7: standing hip abd BTB 10x3 B  Therapeutic Exercise Activity 8: standing hip ext BTB 10x3 B  Therapeutic Exercise Activity 9: sit to stand 10x3  Therapeutic Exercise Activity 10: mini lunge fwd 10x3 B  Therapeutic Exercise Activity 11: calf raise 10x3  Therapeutic Exercise Activity 12: calf stretch 30\"x3 B       Assessment   Assessment:   PT Assessment  Assessment Comment: Pt tolerated session well slight reduction in hip strength with compensation noted however overall did perform exercises well without any increase in symptoms, will continue to focus on overall strength and stability.    Plan:    Hip strength/stabiltiy for knee support     OP EDUCATION:   Continue with HEP     Goals:   Active       PT Problem       Pt will be 100% IND with HEP in 4 weeks in order to maintain " progress with therapy.         Start:  12/04/23    Expected End:  01/03/24            Pt will demosntrated normalized eccentric control during descending stair negotiation in 4 weeks for home ambulation needs.       Start:  12/04/23    Expected End:  01/03/24            Pt will demonstrate subjective improvement of ADLs and recreational activities through improved score of 70 on LEFS in 4 weeks for functional activities at home..        Start:  12/04/23    Expected End:  01/03/24

## 2023-12-18 ENCOUNTER — TREATMENT (OUTPATIENT)
Dept: PHYSICAL THERAPY | Facility: CLINIC | Age: 73
End: 2023-12-18
Payer: MEDICARE

## 2023-12-18 DIAGNOSIS — M25.562 LEFT KNEE PAIN, UNSPECIFIED CHRONICITY: ICD-10-CM

## 2023-12-18 PROCEDURE — 97110 THERAPEUTIC EXERCISES: CPT | Mod: GP,CQ

## 2023-12-18 NOTE — PROGRESS NOTES
"Physical Therapy Treatment    Patient Name: Nahomi Velasquez  MRN: [id number]  Today's Date: 12/18/2023  Time Calculation  Start Time: 0225  Stop Time: 0305  Time Calculation (min): 40 min  PT Therapeutic Procedures Time Entry  Therapeutic Exercise Time Entry: 40  Visit # 3/4    Current Problem   1. Left knee pain, unspecified chronicity  Follow Up In Physical Therapy          Subjective   General    Pt reports no pain.  Precautions:   one  Pain    0/10  Post Treatment Pain Level   0/10  Objective   Slight genu valgus on L     Treatments:  Nu-step x 5'  Touchdowns 4\" x 15  STS 3 x 10 with 1 pad  Side stepping with YTB 6 x 20'  Towel slides 2 x 10 B  Clam shells  with GTB x 20 B  S/L HipABD with GTB x 20 B          Assessment   Assessment:    Pt demonstrated good form with there ex.  Good knee control.    Plan:    Progress LE strengthening with focus on hip strength        Goals:   Active       PT Problem       Pt will be 100% IND with HEP in 4 weeks in order to maintain progress with therapy.   (Progressing)       Start:  12/04/23    Expected End:  01/03/24            Pt will demosntrated normalized eccentric control during descending stair negotiation in 4 weeks for home ambulation needs. (Progressing)       Start:  12/04/23    Expected End:  01/03/24            Pt will demonstrate subjective improvement of ADLs and recreational activities through improved score of 70 on LEFS in 4 weeks for functional activities at home..  (Progressing)       Start:  12/04/23    Expected End:  01/03/24                    "

## 2023-12-28 ENCOUNTER — TREATMENT (OUTPATIENT)
Dept: PHYSICAL THERAPY | Facility: CLINIC | Age: 73
End: 2023-12-28
Payer: MEDICARE

## 2023-12-28 NOTE — PROGRESS NOTES
Physical Therapy Treatment    Patient Name: Nahomi Velasquez  MRN: [id number]  Today's Date: 12/28/2023     Visit # 4/4    Current Problem   No diagnosis found.    Subjective   General    Pt claims she has not had time to do her HEP. Would like to try and give her HEP a good try for a couple weeks and then report back to PT.  Precautions:     Pain      Post Treatment Pain Level     Objective   No treatment today.    Treatments:      Assessment   Assessment:        Plan:        OP EDUCATION:       Goals:   Active       PT Problem       Pt will be 100% IND with HEP in 4 weeks in order to maintain progress with therapy.   (Progressing)       Start:  12/04/23    Expected End:  01/03/24            Pt will demosntrated normalized eccentric control during descending stair negotiation in 4 weeks for home ambulation needs. (Progressing)       Start:  12/04/23    Expected End:  01/03/24            Pt will demonstrate subjective improvement of ADLs and recreational activities through improved score of 70 on LEFS in 4 weeks for functional activities at home..  (Progressing)       Start:  12/04/23    Expected End:  01/03/24

## 2024-01-04 ENCOUNTER — APPOINTMENT (OUTPATIENT)
Dept: PHYSICAL THERAPY | Facility: CLINIC | Age: 74
End: 2024-01-04
Payer: MEDICARE

## 2024-01-16 ENCOUNTER — TREATMENT (OUTPATIENT)
Dept: PHYSICAL THERAPY | Facility: CLINIC | Age: 74
End: 2024-01-16
Payer: MEDICARE

## 2024-01-16 DIAGNOSIS — M25.562 LEFT KNEE PAIN, UNSPECIFIED CHRONICITY: ICD-10-CM

## 2024-01-16 DIAGNOSIS — M25.562 LEFT KNEE PAIN, UNSPECIFIED CHRONICITY: Primary | ICD-10-CM

## 2024-01-16 PROCEDURE — 97110 THERAPEUTIC EXERCISES: CPT | Mod: GP,CQ

## 2024-01-16 NOTE — PROGRESS NOTES
Physical Therapy Treatment    Patient Name: Nahomi Velasquez  MRN: [id number]  Today's Date: 1/16/2024  Time Calculation  Start Time: 1250  Stop Time: 1335  Time Calculation (min): 45 min  PT Therapeutic Procedures Time Entry  Therapeutic Exercise Time Entry: 45  Visit # 4/6    Current Problem   1. Left knee pain, unspecified chronicity  Follow Up In Physical Therapy          Subjective   General    Pt claims she has joined [employer]. She is currently only doing cardio and no strengthening at the gym because she is unsure of what to do.  Precautions:   None  Pain    0/10  Post Treatment Pain Level 0/10    Objective   Strength: L knee  Quad 5/5  HS 4/5    Hip:   ADD 5/5  Flex 4/5  ABD 4-/5    Stairs:  Reciprocol with 1 HR for support. Valgus noted in L when descending    Treatments:   Nu step L5 x 10'  ELP #80 2 x 10  HS curls #40 2 x 10  Knee extension  #30 2 x 10  Clam shells with BTB  S/L Hip ABD with BTB    Assessment   Assessment:    Focused on over all strengthening of LE's with special attention to L glute med to prevent valgus with eccentric lowering.    Evaluating therapist spoke with patient at start of session, discussed continuation at biweekly as pt has begun going IND at gym however does not have the knowledge to continue with exercise without continuation of skilled therapy, discussed pt will progress at gym and report back for assessment and progression until pt demonstrates safe technique to go IND. Naa Turner PT DPT    Plan:    Continue to work on strengthening to improve safety with stairs and functional mobility.    OP EDUCATION:   Pt educated on how to use nautilus equipment for LE's at gym for when pt is discharged      Goals:   Active       PT Problem       Pt will be 100% IND with HEP in 4 weeks in order to maintain progress with therapy.   (Progressing)       Start:  12/04/23    Expected End:  01/03/24            Pt will demosntrated normalized eccentric control during descending stair  negotiation in 4 weeks for home ambulation needs. (Progressing)       Start:  12/04/23    Expected End:  01/03/24            Pt will demonstrate subjective improvement of ADLs and recreational activities through improved score of 70 on LEFS in 4 weeks for functional activities at home..  (Progressing)       Start:  12/04/23    Expected End:  01/03/24

## 2024-01-17 ENCOUNTER — LAB (OUTPATIENT)
Dept: LAB | Facility: LAB | Age: 74
End: 2024-01-17
Payer: MEDICARE

## 2024-01-17 ENCOUNTER — OFFICE VISIT (OUTPATIENT)
Dept: RHEUMATOLOGY | Facility: CLINIC | Age: 74
End: 2024-01-17
Payer: MEDICARE

## 2024-01-17 VITALS — SYSTOLIC BLOOD PRESSURE: 138 MMHG | BODY MASS INDEX: 21.14 KG/M2 | WEIGHT: 131 LBS | DIASTOLIC BLOOD PRESSURE: 66 MMHG

## 2024-01-17 DIAGNOSIS — M35.3 PMR (POLYMYALGIA RHEUMATICA) (MULTI): ICD-10-CM

## 2024-01-17 DIAGNOSIS — M19.91 PRIMARY OSTEOARTHRITIS, UNSPECIFIED SITE: ICD-10-CM

## 2024-01-17 DIAGNOSIS — M81.0 OSTEOPOROSIS, UNSPECIFIED OSTEOPOROSIS TYPE, UNSPECIFIED PATHOLOGICAL FRACTURE PRESENCE: ICD-10-CM

## 2024-01-17 DIAGNOSIS — M35.3 PMR (POLYMYALGIA RHEUMATICA) (MULTI): Primary | ICD-10-CM

## 2024-01-17 LAB — CRP SERPL-MCNC: 0.19 MG/DL

## 2024-01-17 PROCEDURE — 1036F TOBACCO NON-USER: CPT | Performed by: INTERNAL MEDICINE

## 2024-01-17 PROCEDURE — 1159F MED LIST DOCD IN RCRD: CPT | Performed by: INTERNAL MEDICINE

## 2024-01-17 PROCEDURE — 3008F BODY MASS INDEX DOCD: CPT | Performed by: INTERNAL MEDICINE

## 2024-01-17 PROCEDURE — 86140 C-REACTIVE PROTEIN: CPT

## 2024-01-17 PROCEDURE — 3075F SYST BP GE 130 - 139MM HG: CPT | Performed by: INTERNAL MEDICINE

## 2024-01-17 PROCEDURE — 99214 OFFICE O/P EST MOD 30 MIN: CPT | Performed by: INTERNAL MEDICINE

## 2024-01-17 PROCEDURE — 36415 COLL VENOUS BLD VENIPUNCTURE: CPT

## 2024-01-17 PROCEDURE — 1126F AMNT PAIN NOTED NONE PRSNT: CPT | Performed by: INTERNAL MEDICINE

## 2024-01-17 PROCEDURE — 3078F DIAST BP <80 MM HG: CPT | Performed by: INTERNAL MEDICINE

## 2024-01-17 PROCEDURE — 1160F RVW MEDS BY RX/DR IN RCRD: CPT | Performed by: INTERNAL MEDICINE

## 2024-01-17 NOTE — PROGRESS NOTES
Recheck  OA  /  PMR  doing well off prednisone. Cont left knee pain, relief with injection and PT.    HPI - she continues to have knee pain - injection helped some.  She is currently in PT.  Occ gets 4th trigger fingers with use.  No other pain.  No swelling.  Mild AM stiffness not too long.  No CP, resp, or GI.  No HA, tongue/jaw john, or visual changes    PE  NAD  Good TA pulses, NT, no head/neck bruits  RRR no r/m/g  CTA  2+ DP, PT, and rad pulses  No edema  No synovitis  L knee no swelling, +/- pain with ROM  No triggering elicited    A/P - OA and PMR - no probs off pred  L knee some improvement with injection and PT - consider ortho eval if no further improvement  OP - on ibandronate - check DEXA 7/25 for poss drug holiday  Check CRP  Reeval 3 mo - if stable, likely yrly follow up

## 2024-01-30 ENCOUNTER — TREATMENT (OUTPATIENT)
Dept: PHYSICAL THERAPY | Facility: CLINIC | Age: 74
End: 2024-01-30
Payer: MEDICARE

## 2024-01-30 DIAGNOSIS — M25.562 LEFT KNEE PAIN, UNSPECIFIED CHRONICITY: ICD-10-CM

## 2024-01-30 PROCEDURE — 97110 THERAPEUTIC EXERCISES: CPT | Mod: GP,CQ

## 2024-01-30 NOTE — PROGRESS NOTES
Physical Therapy Treatment    Patient Name: Nahomi Velasquez  MRN: [id number]  Today's Date: 1/30/2024  Time Calculation  Start Time: 1330  Stop Time: 1410  Time Calculation (min): 40 min  PT Therapeutic Procedures Time Entry  Manual Therapy Time Entry: 5  Therapeutic Exercise Time Entry: 35  Visit # 5/6    Current Problem   1. Left knee pain, unspecified chronicity  Follow Up In Physical Therapy          Subjective   General    Pt feels about the same. She has been going to the gym and doing her HEP. Reports minimal pain along L patellar tendon.   Precautions:     Pain    0-2/10  Post Treatment Pain Level 0-2/10    Objective   Minimal pain increases with eccentric lowering in L knee wne descending stairs.  Valgus in both knees with touchdowns L>R  Treatments:   Clam shells with BTB 2 x 15   S/L Hip ABD with BTB 2x 15  Bridges off heels with hip ABD with BTB 2 x 15  Touchdowns with 4 inch step     K-tape to L patellar tendon      Assessment   Assessment:    B Hip weakness remains an issue. Faulty gait mechanics when negotiating stairs due to glute med weakness B.    Plan:   Reassess next visit.     OP EDUCATION:   Pt advised to keep the tape on for 3 days. Focus on hip strengthening tasks.    Goals:   Active       PT Problem       Pt will be 100% IND with HEP in 4 weeks in order to maintain progress with therapy.   (Progressing)       Start:  12/04/23    Expected End:  01/03/24            Pt will demosntrated normalized eccentric control during descending stair negotiation in 4 weeks for home ambulation needs. (Progressing)       Start:  12/04/23    Expected End:  01/03/24            Pt will demonstrate subjective improvement of ADLs and recreational activities through improved score of 70 on LEFS in 4 weeks for functional activities at home..  (Progressing)       Start:  12/04/23    Expected End:  01/03/24

## 2024-02-14 ENCOUNTER — TREATMENT (OUTPATIENT)
Dept: PHYSICAL THERAPY | Facility: CLINIC | Age: 74
End: 2024-02-14
Payer: MEDICARE

## 2024-02-14 DIAGNOSIS — M25.562 LEFT KNEE PAIN, UNSPECIFIED CHRONICITY: ICD-10-CM

## 2024-02-14 PROCEDURE — 97530 THERAPEUTIC ACTIVITIES: CPT | Mod: GP | Performed by: PHYSICAL THERAPIST

## 2024-02-14 ASSESSMENT — PAIN SCALES - GENERAL: PAINLEVEL_OUTOF10: 8

## 2024-02-14 ASSESSMENT — PAIN - FUNCTIONAL ASSESSMENT: PAIN_FUNCTIONAL_ASSESSMENT: 0-10

## 2024-02-14 ASSESSMENT — PAIN DESCRIPTION - DESCRIPTORS: DESCRIPTORS: SHARP

## 2024-02-14 NOTE — PROGRESS NOTES
Physical Therapy Treatment    Patient Name: Nahomi Velasquez  MRN: [id number]  Today's Date: 2/14/2024  Time Calculation  Start Time: 1311  Stop Time: 1345  Time Calculation (min): 34 min  PT Therapeutic Procedures Time Entry  Therapeutic Activity Time Entry: 30    Insurance:  Visit number: 6 of 10  Authorization info: No Auth; [place] visits; $40 co pay   Insurance Type: Payor: AETNA MEDICARE / Plan: AETNA MEDICARE ASSURE / Product Type: *No Product type* /     Current Problem   1. Left knee pain, unspecified chronicity  Follow Up In Physical Therapy    Follow Up In Physical Therapy          Subjective   General   General Comment: Pt reports monday she was moving really well but than yesterday she had increased L knee pain. She was doing a lot of steps in a reciprocal pattern which may have caused the increase. Pain along inferior poll of L knee. She did have KT tape applied last session which seemed to help.  Precautions:     Pain   Pain Assessment: 0-10  Pain Score: 8 (When performing stair negotiation)  Pain Type: Acute pain  Pain Location: Knee  Pain Orientation: Left  Pain Descriptors: Sharp  Post Treatment Pain Level 0    Objective   L knee valgus during squats along with shift away from R side   Sit to stand grossly normal no large abnormality seen   SLS equal bilaterally  Ambulation grossly normal  Stair negotiation noted continued reduction in eccentric control and increased push with L during eccentric (reported on 2/10 pain during assessment)   ROM WNL L knee  L hip strength: flexion 5/5; abduction 5/5; ER 5/5  L patella appears slightly hypermobile compared to R LE - fair tracking     Treatments:    Therapeutic activity:  Therapeutic Activity  Therapeutic Activity Performed: Yes  Therapeutic Activity 1: Re assessment see objective  Therapeutic Activity 2: KT taping L knee 1 - I strip lateral along patella inferior      Assessment   Assessment:   PT Assessment  Assessment Comment: Pt initially made good progress  during first series of therapy however upon return this date for re-assessment L knee pain has returned. After reassessment pt appears to have reduced patellar control with medial collpase due to functional weakness during stair negotiation and squatting. At this time pt would benefit from continued skilled physical therapy inorder to progress hip strength for knee stabiltiy and prevention of reoccurence. Pt in agreement    Plan:    1/ week for 6 visits    OP EDUCATION:   Discussed use of Dry needling next session for improved blood flow     Goals:   Active       PT Problem       Pt will be 100% IND with HEP in 4 weeks in order to maintain progress with therapy.   (Progressing)       Start:  12/04/23    Expected End:  01/03/24            Pt will demosntrated normalized eccentric control during descending stair negotiation in 4 weeks for home ambulation needs. (Progressing)       Start:  12/04/23    Expected End:  01/03/24            Pt will demonstrate subjective improvement of ADLs and recreational activities through improved score of 70 on LEFS in 4 weeks for functional activities at home..  (Progressing)       Start:  12/04/23    Expected End:  01/03/24

## 2024-02-20 ENCOUNTER — OFFICE VISIT (OUTPATIENT)
Dept: PRIMARY CARE | Facility: CLINIC | Age: 74
End: 2024-02-20
Payer: MEDICARE

## 2024-02-20 DIAGNOSIS — Z92.29 IMMUNIZATIONS REVIEWED AND UP TO DATE: ICD-10-CM

## 2024-02-20 DIAGNOSIS — I10 SYSTOLIC HYPERTENSION, ISOLATED: Primary | ICD-10-CM

## 2024-02-20 PROCEDURE — 3078F DIAST BP <80 MM HG: CPT | Performed by: INTERNAL MEDICINE

## 2024-02-20 PROCEDURE — 1123F ACP DISCUSS/DSCN MKR DOCD: CPT | Performed by: INTERNAL MEDICINE

## 2024-02-20 PROCEDURE — 1157F ADVNC CARE PLAN IN RCRD: CPT | Performed by: INTERNAL MEDICINE

## 2024-02-20 PROCEDURE — 3008F BODY MASS INDEX DOCD: CPT | Performed by: INTERNAL MEDICINE

## 2024-02-20 PROCEDURE — 99213 OFFICE O/P EST LOW 20 MIN: CPT | Performed by: INTERNAL MEDICINE

## 2024-02-20 PROCEDURE — 1036F TOBACCO NON-USER: CPT | Performed by: INTERNAL MEDICINE

## 2024-02-20 PROCEDURE — 1159F MED LIST DOCD IN RCRD: CPT | Performed by: INTERNAL MEDICINE

## 2024-02-20 PROCEDURE — 3077F SYST BP >= 140 MM HG: CPT | Performed by: INTERNAL MEDICINE

## 2024-02-20 NOTE — PROGRESS NOTES
Subjective   Patient ID: Nahomi Velasquez is a 73 y.o. female who presents for Follow-up.  HPI  No acute complaints  Had flu, COVID-19, RSV, and Prevnar 20 vaccines    Review of Systems   Respiratory: Negative.     Cardiovascular: Negative.    Neurological: Negative.        Objective   Physical Exam  Constitutional:       Appearance: Normal appearance.   Cardiovascular:      Pulses: Normal pulses.      Heart sounds: Normal heart sounds.   Pulmonary:      Effort: Pulmonary effort is normal.      Breath sounds: Normal breath sounds.   Neurological:      General: No focal deficit present.      Mental Status: She is alert.       /74   Pulse 74   Resp 16     Assessment/Plan     Isolated systolic hypertension mild, asymptomatic  Continue lifestyle therapy  Immunizations reviewed and reinforced    Problem List Items Addressed This Visit       Systolic hypertension, isolated - Primary    Immunizations reviewed and up to date

## 2024-02-26 DIAGNOSIS — M81.0 OSTEOPOROSIS, UNSPECIFIED OSTEOPOROSIS TYPE, UNSPECIFIED PATHOLOGICAL FRACTURE PRESENCE: Primary | ICD-10-CM

## 2024-02-26 RX ORDER — IBANDRONATE SODIUM 150 MG/1
150 TABLET, FILM COATED ORAL
Qty: 3 TABLET | Refills: 0 | Status: SHIPPED | OUTPATIENT
Start: 2024-02-26 | End: 2024-05-24

## 2024-02-28 ENCOUNTER — TREATMENT (OUTPATIENT)
Dept: PHYSICAL THERAPY | Facility: CLINIC | Age: 74
End: 2024-02-28
Payer: MEDICARE

## 2024-02-28 DIAGNOSIS — M25.562 LEFT KNEE PAIN, UNSPECIFIED CHRONICITY: ICD-10-CM

## 2024-02-28 PROCEDURE — 97110 THERAPEUTIC EXERCISES: CPT | Mod: GP,CQ

## 2024-02-28 NOTE — PROGRESS NOTES
"Physical Therapy Treatment    Patient Name: Nahomi Velasquez  MRN: [id number]  Today's Date: 2/28/2024  Time Calculation  Start Time: 1455  Stop Time: 1535  Time Calculation (min): 40 min  PT Therapeutic Procedures Time Entry  Manual Therapy Time Entry: 5  Therapeutic Exercise Time Entry: 35    Insurance:  Visit number: 7 of 12  Authorization info: DD - - [employer] Adv Silver - No auth. [place] visits. $40 copay until OOP is met. OOP $ 4900 - $363 met. // pt scheduled epic order   Insurance Type: Payor: AETNA MEDICARE / Plan: [employer]NA MEDICARE ASSURE / Product Type: *No Product type* /     Current Problem   1. Left knee pain, unspecified chronicity  Follow Up In Physical Therapy          Subjective   General    Pt claims she feels about the same.  Precautions:   None  Pain    1/10  Post Treatment Pain Level same    Objective   Tenderness palpated along L patellar tendon    Treatments:  Therapeutic Exercise:  NU-step x 5'  L knee isometrics at 30, 60,90 2 x 10 each at 5\" holds  L SLR 5 x 10      Manual:  IDN performed this date. Pt educated on risks and benefits of dry needling. Pt provided verbal consent. All universal precautions followed.    3 - 30 mm L infrapatellar region     No adverse response. All IDN guidelines and safety protocols followed.      Assessment   Assessment:    Pt had no pain with isometric series.     Plan:    Will monitor the affects of the DN technique.    OP EDUCATION:   Revised HEP to include knee flextension isometrics    Goals:   Active       PT Problem       Pt will be 100% IND with HEP in 4 weeks in order to maintain progress with therapy.   (Met)       Start:  12/04/23    Expected End:  01/03/24    Resolved:  02/28/24         Pt will demosntrated normalized eccentric control during descending stair negotiation in 4 weeks for home ambulation needs. (Progressing)       Start:  12/04/23    Expected End:  01/03/24            Pt will demonstrate subjective improvement of ADLs and recreational activities " through improved score of 70 on LEFS in 4 weeks for functional activities at home..  (Progressing)       Start:  12/04/23    Expected End:  01/03/24

## 2024-03-06 ENCOUNTER — TREATMENT (OUTPATIENT)
Dept: PHYSICAL THERAPY | Facility: CLINIC | Age: 74
End: 2024-03-06
Payer: MEDICARE

## 2024-03-06 DIAGNOSIS — M25.562 LEFT KNEE PAIN, UNSPECIFIED CHRONICITY: ICD-10-CM

## 2024-03-06 PROCEDURE — 97140 MANUAL THERAPY 1/> REGIONS: CPT | Mod: GP | Performed by: PHYSICAL THERAPIST

## 2024-03-06 PROCEDURE — 97110 THERAPEUTIC EXERCISES: CPT | Mod: GP | Performed by: PHYSICAL THERAPIST

## 2024-03-06 ASSESSMENT — PAIN - FUNCTIONAL ASSESSMENT: PAIN_FUNCTIONAL_ASSESSMENT: 0-10

## 2024-03-06 ASSESSMENT — PAIN SCALES - GENERAL: PAINLEVEL_OUTOF10: 5 - MODERATE PAIN

## 2024-03-06 NOTE — PROGRESS NOTES
Physical Therapy Treatment    Patient Name: Nahomi Velasquez  MRN: [id number]  Today's Date: 3/6/2024  Time Calculation  Start Time: 1133  Stop Time: 1211  Time Calculation (min): 38 min  PT Therapeutic Procedures Time Entry  Manual Therapy Time Entry: 10  Therapeutic Exercise Time Entry: 28    Insurance:  Visit number: 8 of 12  Authorization info: No Auth; [place] visits; $40 co pay    Insurance Type: Payor: AETNA MEDICARE / Plan: AETNA MEDICARE ASSURE / Product Type: *No Product type* /     Current Problem   1. Left knee pain, unspecified chronicity  Follow Up In Physical Therapy          Subjective   General   General Comment: Pt reports the needles did help, she is still having pain with stairs, transfers out of a chair and car.  Precautions:  Precautions  Precautions Comment: None  Pain   Pain Assessment: 0-10  Pain Score: 5 - Moderate pain  Post Treatment Pain Level 0    Objective   L knee ROM WNL  Joint mobs grossly normal     Treatments:  Therapeutic Exercise:  Therapeutic Exercise  Therapeutic Exercise Performed: Yes  Therapeutic Exercise Activity 1: NuStep L5 5'  Therapeutic Exercise Activity 2: SLR 10x3 B  Therapeutic Exercise Activity 3: h/l hip abd BTB with leg kick 10x3  Therapeutic Exercise Activity 4: sit to stand 5x3  Therapeutic Exercise Activity 5: step up at stairs fwd 10x2  Therapeutic activity:  Therapeutic Activity  Therapeutic Activity Performed: Yes  Therapeutic Activity 1: PA mobs L knee  Therapeutic Activity 2: Dry needling  IDN performed this date. Pt educated on risks and benefits of dry needling. Pt provided verbal consent. All universal precautions followed.    3 - 30 mm L patellar region   1 - 30 mm L quad   2 - 30 mm L knee joint     No adverse response. All IDN guidelines and safety protocols followed.      Assessment   Assessment:   PT Assessment  Assessment Comment: Pt tolerated session well focus on manual techniques intermittent with strengthening to improve BF within joint and overall  mobiltiy will continue to progress as tolerated.    Plan:    Manual and strengthening     OP EDUCATION:   Use of heat     Goals:   Active       PT Problem       Pt will be 100% IND with HEP in 4 weeks in order to maintain progress with therapy.   (Met)       Start:  12/04/23    Expected End:  01/03/24    Resolved:  02/28/24         Pt will demosntrated normalized eccentric control during descending stair negotiation in 4 weeks for home ambulation needs. (Progressing)       Start:  12/04/23    Expected End:  01/03/24            Pt will demonstrate subjective improvement of ADLs and recreational activities through improved score of 70 on LEFS in 4 weeks for functional activities at home..  (Progressing)       Start:  12/04/23    Expected End:  01/03/24

## 2024-03-13 ENCOUNTER — TREATMENT (OUTPATIENT)
Dept: PHYSICAL THERAPY | Facility: CLINIC | Age: 74
End: 2024-03-13
Payer: MEDICARE

## 2024-03-13 VITALS — SYSTOLIC BLOOD PRESSURE: 156 MMHG | HEART RATE: 74 BPM | RESPIRATION RATE: 16 BRPM | DIASTOLIC BLOOD PRESSURE: 74 MMHG

## 2024-03-13 DIAGNOSIS — M25.562 LEFT KNEE PAIN, UNSPECIFIED CHRONICITY: ICD-10-CM

## 2024-03-13 PROBLEM — Z92.29 IMMUNIZATIONS REVIEWED AND UP TO DATE: Status: ACTIVE | Noted: 2024-03-13

## 2024-03-13 PROCEDURE — 97110 THERAPEUTIC EXERCISES: CPT | Mod: GP | Performed by: PHYSICAL THERAPIST

## 2024-03-13 PROCEDURE — 97140 MANUAL THERAPY 1/> REGIONS: CPT | Mod: GP | Performed by: PHYSICAL THERAPIST

## 2024-03-13 ASSESSMENT — PAIN - FUNCTIONAL ASSESSMENT: PAIN_FUNCTIONAL_ASSESSMENT: 0-10

## 2024-03-13 ASSESSMENT — ENCOUNTER SYMPTOMS
NEUROLOGICAL NEGATIVE: 1
RESPIRATORY NEGATIVE: 1
CARDIOVASCULAR NEGATIVE: 1

## 2024-03-13 ASSESSMENT — PAIN SCALES - GENERAL: PAINLEVEL_OUTOF10: 0 - NO PAIN

## 2024-03-13 NOTE — PROGRESS NOTES
"Physical Therapy Treatment    Patient Name: Nahomi Velasquez  MRN: [id number]  Today's Date: 3/13/2024  Time Calculation  Start Time: 1445  Stop Time: 1524  Time Calculation (min): 39 min  PT Therapeutic Procedures Time Entry  Manual Therapy Time Entry: 14  Therapeutic Exercise Time Entry: 25    Insurance:  Visit number: 9 of 12  Authorization info: No Auth; [place] visits; $40 co pay     Insurance Type: Payor: AET MEDICARE / Plan: AETNA MEDICARE ASSURE / Product Type: *No Product type* /     Current Problem   1. Left knee pain, unspecified chronicity  Follow Up In Physical Therapy          Subjective   General   General Comment: Pt reports stairs continue to be problematic for her but otherwise there has been improvement.  Precautions:  Precautions  Precautions Comment: none  Pain   Pain Assessment: 0-10  Pain Score: 0 - No pain  Post Treatment Pain Level 0    Objective   Valgus collapse with reduced eccentric control     Treatments:  Therapeutic Exercise:  Therapeutic Exercise  Therapeutic Exercise Performed: Yes  Therapeutic Exercise Activity 1: NuStep L5 5'  Therapeutic Exercise Activity 2: lateral tap down 2\" 10x3  Therapeutic Exercise Activity 3: fwd tap down 2\" 10x3  Therapeutic Exercise Activity 4: step up 4\" box 10x3  Therapeutic Exercise Activity 5: B leg press #80 10x; #100 10x2  Therapeutic activity:  Therapeutic Activity  Therapeutic Activity Performed: Yes  Therapeutic Activity 1: KT tape (I strip along medial knee; I strip horizontal for patellar support)  Therapeutic Activity 2: Dry needling  IDN performed this date. Pt educated on risks and benefits of dry needling. Pt provided verbal consent. All universal precautions followed.    1 - 30 mm L patellar tendon  2 - 30 mm L medial knee  1 - 30 mm L common fibular  1 - 30 mm L saphenous    No adverse response. All IDN guidelines and safety protocols followed.      Assessment   Assessment:   PT Assessment  Assessment Comment: Pt tolerated session well noted " reduction in functional strenght but only minimal discomfort reported. Sensitvity along HTrp noted with dry needling. Will continue as tolerated.    Plan:    Continue with functional strengthening and dry needling/manual as needed    OP EDUCATION:   Continue with established HEP     Goals:   Active       PT Problem       Pt will be 100% IND with HEP in 4 weeks in order to maintain progress with therapy.   (Met)       Start:  12/04/23    Expected End:  01/03/24    Resolved:  02/28/24         Pt will demosntrated normalized eccentric control during descending stair negotiation in 4 weeks for home ambulation needs. (Progressing)       Start:  12/04/23    Expected End:  01/03/24            Pt will demonstrate subjective improvement of ADLs and recreational activities through improved score of 70 on LEFS in 4 weeks for functional activities at home..  (Progressing)       Start:  12/04/23    Expected End:  01/03/24

## 2024-03-20 ENCOUNTER — TREATMENT (OUTPATIENT)
Dept: PHYSICAL THERAPY | Facility: CLINIC | Age: 74
End: 2024-03-20
Payer: MEDICARE

## 2024-03-20 DIAGNOSIS — M25.562 LEFT KNEE PAIN, UNSPECIFIED CHRONICITY: ICD-10-CM

## 2024-03-20 PROCEDURE — 97140 MANUAL THERAPY 1/> REGIONS: CPT | Mod: GP,CQ

## 2024-03-20 PROCEDURE — 97110 THERAPEUTIC EXERCISES: CPT | Mod: GP,CQ

## 2024-03-20 NOTE — PROGRESS NOTES
"Physical Therapy Treatment    Patient Name: Nahomi Velasquez  MRN: [id number]  Today's Date: 3/20/2024  Time Calculation  Start Time: 1245  Stop Time: 1325  Time Calculation (min): 40 min  PT Therapeutic Procedures Time Entry  Manual Therapy Time Entry: 10  Therapeutic Exercise Time Entry: 30    Insurance:  Visit number:10 of 12  Authorization info: DD - - [employer] MC Adv Silver - No auth. [place] visits. $40 copay until OOP is met. OOP $ 4900 - $363 met. // pt scheduled epic order   Insurance Type  Insurance Type: Payor: AET MEDICARE / Plan: AETNA MEDICARE ASSURE / Product Type: *No Product type* /     Current Problem   1. Left knee pain, unspecified chronicity  Follow Up In Physical Therapy          Subjective   General    Pt reports that she feels her discomfort fluctuates.  Precautions:     Pain    4/10  Post Treatment Pain Level 0/10    Objective   Pt still presents with functional hip weakness (glute med)   Minimal tenderness noted across L patellar tendon    Treatments:   NuStep L5 5'  lateral tap down 2\" 10x3  fwd tap down 2\" 10x3  step up 6\" box 10x3  B leg press  #100 10 x 3  Side stepping with yellow band 8 x 20'      KT tape (I strip along medial knee; I strip horizontal for patellar support)     Dry needling  IDN performed this date. Pt educated on risks and benefits of dry needling. Pt provided verbal consent. All universal precautions followed.     1 - 30 mm L patellar tendon  2 - 30 mm L medial knee  1 - 30 mm L common fibular  1 - 30 mm L saphenous     No adverse response. All IDN guidelines and safety protocols followed. Performed by Naa Turner PT DPT         Assessment   Assessment:    Pt had no pain in L knee after session.   Much improved eccentric control of L knee  Plan:    Progress hip/LE strengthening    OP EDUCATION:   Modified step-up task at home to include better awareness of knee alignment with step-ups    Goals:   Active       PT Problem       Pt will be 100% IND with HEP in 4 weeks in " order to maintain progress with therapy.   (Met)       Start:  12/04/23    Expected End:  01/03/24    Resolved:  02/28/24         Pt will demosntrated normalized eccentric control during descending stair negotiation in 4 weeks for home ambulation needs. (Met)       Start:  12/04/23    Expected End:  01/03/24    Resolved:  03/20/24         Pt will demonstrate subjective improvement of ADLs and recreational activities through improved score of 70 on LEFS in 4 weeks for functional activities at home..  (Progressing)       Start:  12/04/23    Expected End:  01/03/24

## 2024-03-27 ENCOUNTER — TREATMENT (OUTPATIENT)
Dept: PHYSICAL THERAPY | Facility: CLINIC | Age: 74
End: 2024-03-27
Payer: MEDICARE

## 2024-03-27 DIAGNOSIS — M25.562 LEFT KNEE PAIN, UNSPECIFIED CHRONICITY: ICD-10-CM

## 2024-03-27 PROCEDURE — 97140 MANUAL THERAPY 1/> REGIONS: CPT | Mod: GP,CQ

## 2024-03-27 PROCEDURE — 97110 THERAPEUTIC EXERCISES: CPT | Mod: GP,CQ

## 2024-03-27 NOTE — PROGRESS NOTES
"Physical Therapy Treatment    Patient Name: Nahomi Velasquez  MRN: [id number]  Today's Date: 3/27/2024  Time Calculation  Start Time: 1245  Stop Time: 1325  Time Calculation (min): 40 min  PT Therapeutic Procedures Time Entry  Manual Therapy Time Entry: 15  Therapeutic Exercise Time Entry: 25    Insurance:  Visit number: 11 of 12  Authorization info:  DD - - A[employer]na MC Adv Silver - No auth. [place] visits. $40 copay until OOP is met. OOP $ 4900 - $363 met. // pt scheduled epic order   Insurance Type: Payor: AETNA MEDICARE / Plan: [email or web address] MEDICARE ASSURE / Product Type: *No Product type* /     Current Problem   1. Left knee pain, unspecified chronicity  Follow Up In Physical Therapy          Subjective   General    Pt feels about the same.  Precautions:   none  Pain    3-4/10 with walking  Post Treatment Pain Level less    Objective   Improved valgus control with L knee    Treatments:  Therapeutic Exercise:   NuStep L5 5'  lateral tap down 2\" 10x3  fwd tap down 2\" 10x3  step up 6\" box 10x3  B leg press  #100 10 x 3  HS curls 3 x10 #40  Towel slides 2 x 10  Side stepping with yellow band 8 x 20'     Manual:   KT tape (I strip along medial knee; I strip horizontal for patellar support)     Dry needling  IDN performed this date. Pt educated on risks and benefits of dry needling. Pt provided verbal consent. All universal precautions followed.     1 - 30 mm L patellar tendon  2 - 30 mm L medial knee  1 - 30 mm L common fibular  1 - 30 mm L saphenous    All IDN susan and safety protocols followed. Performed By Naa Turner PT DPT    Assessment   Assessment:    Pt tolerated session well.     Plan:    Reassessment next session.    OP EDUCATION:   Pt advised to continue with HEP.    Goals:   Active       PT Problem       Pt will be 100% IND with HEP in 4 weeks in order to maintain progress with therapy.   (Met)       Start:  12/04/23    Expected End:  01/03/24    Resolved:  02/28/24         Pt will demosntrated normalized " eccentric control during descending stair negotiation in 4 weeks for home ambulation needs. (Met)       Start:  12/04/23    Expected End:  01/03/24    Resolved:  03/20/24         Pt will demonstrate subjective improvement of ADLs and recreational activities through improved score of 70 on LEFS in 4 weeks for functional activities at home..  (Progressing)       Start:  12/04/23    Expected End:  01/03/24

## 2024-04-03 ENCOUNTER — TREATMENT (OUTPATIENT)
Dept: PHYSICAL THERAPY | Facility: CLINIC | Age: 74
End: 2024-04-03
Payer: MEDICARE

## 2024-04-03 DIAGNOSIS — M25.562 LEFT KNEE PAIN, UNSPECIFIED CHRONICITY: ICD-10-CM

## 2024-04-03 PROCEDURE — 97110 THERAPEUTIC EXERCISES: CPT | Mod: GP | Performed by: PHYSICAL THERAPIST

## 2024-04-03 PROCEDURE — 97140 MANUAL THERAPY 1/> REGIONS: CPT | Mod: GP | Performed by: PHYSICAL THERAPIST

## 2024-04-03 ASSESSMENT — PAIN SCALES - GENERAL: PAINLEVEL_OUTOF10: 0 - NO PAIN

## 2024-04-03 ASSESSMENT — PAIN - FUNCTIONAL ASSESSMENT: PAIN_FUNCTIONAL_ASSESSMENT: 0-10

## 2024-04-03 NOTE — PROGRESS NOTES
Physical Therapy Treatment    Patient Name: Nahomi Velasquez  MRN: [id number]  Today's Date: 4/3/2024  Time Calculation  Start Time: 1310  Stop Time: 1355  Time Calculation (min): 45 min  PT Therapeutic Procedures Time Entry  Manual Therapy Time Entry: 15  Therapeutic Exercise Time Entry: 30    Insurance:  Visit number: 12 of 12  Authorization info: No auth. [place] visits. $40 copay until OOP is met.   Insurance Type: Payor: [employer]ICARE / Plan: [employer] MEDICARE ASSURE / Product Type: *No Product type* /     Current Problem   1. Left knee pain, unspecified chronicity  Follow Up In Physical Therapy          Subjective   General   General Comment: Pt feels her knee is better today. Intermittent discomfort with stair negotiation. She has been sitting more over the last few days.  Precautions:  Precautions  Precautions Comment: none  Pain   Pain Assessment: 0-10  Pain Score: 0 - No pain  Post Treatment Pain Level 0    Objective   Continues to have slight functional hip abd weakness with ER compensation during activity    Treatments:  Therapeutic Exercise:  Therapeutic Exercise  Therapeutic Exercise Performed: Yes  Therapeutic Exercise Activity 1: Recumbent bike 6'  Therapeutic Exercise Activity 2: B leg press #100 10x3  Therapeutic Exercise Activity 3: seated leg curl #30 10x3  Therapeutic Exercise Activity 4: seated leg extension #20 10x3  Therapeutic Exercise Activity 5: lateral tap down #4 box 10x3 B  Therapeutic Exercise Activity 6: lateral steps yellow loop @ ankle 4 laps    Manual Therapy:  KT tape  Dry needling  IDN performed this date. Pt educated on risks and benefits of dry needling. Pt provided verbal consent. All universal precautions followed.    3 - 30 mm L patellar tendon  1 - 30 mm L common fibular  1 - 30 mm L saphenous    No adverse response. All IDN guidelines and safety protocols followed.    Assessment   Assessment:   PT Assessment  Assessment Comment: Pt and PT discussed POC, pt is compliant with HEP, feels  confident continuing IND. Review of HEP and overall strengthening this date with use of manual therapy for pain control, will hold chart for at least 30 days before formal discharge.    Plan:    Hold chart for approximately 30 days, pt going IND with HEP     OP EDUCATION:   Continue with established HEP; use of KT tape or sleeve brace - discussed not using actual brace to prevent dependence    Goals:   Active       PT Problem       Pt will be 100% IND with HEP in 4 weeks in order to maintain progress with therapy.   (Met)       Start:  12/04/23    Expected End:  01/03/24    Resolved:  02/28/24         Pt will demosntrated normalized eccentric control during descending stair negotiation in 4 weeks for home ambulation needs. (Met)       Start:  12/04/23    Expected End:  01/03/24    Resolved:  03/20/24         Pt will demonstrate subjective improvement of ADLs and recreational activities through improved score of 70 on LEFS in 4 weeks for functional activities at home..  (Progressing)       Start:  12/04/23    Expected End:  01/03/24             Other Measures  Lower Extremity Funtional Score (LEFS): 55

## 2024-04-17 ENCOUNTER — OFFICE VISIT (OUTPATIENT)
Dept: RHEUMATOLOGY | Facility: CLINIC | Age: 74
End: 2024-04-17
Payer: MEDICARE

## 2024-04-17 VITALS — BODY MASS INDEX: 22.27 KG/M2 | SYSTOLIC BLOOD PRESSURE: 142 MMHG | DIASTOLIC BLOOD PRESSURE: 66 MMHG | WEIGHT: 138 LBS

## 2024-04-17 DIAGNOSIS — M81.0 OSTEOPOROSIS, UNSPECIFIED OSTEOPOROSIS TYPE, UNSPECIFIED PATHOLOGICAL FRACTURE PRESENCE: ICD-10-CM

## 2024-04-17 DIAGNOSIS — M19.91 PRIMARY OSTEOARTHRITIS, UNSPECIFIED SITE: Primary | ICD-10-CM

## 2024-04-17 DIAGNOSIS — M35.3 PMR (POLYMYALGIA RHEUMATICA) (MULTI): ICD-10-CM

## 2024-04-17 PROCEDURE — 1159F MED LIST DOCD IN RCRD: CPT | Performed by: INTERNAL MEDICINE

## 2024-04-17 PROCEDURE — 99214 OFFICE O/P EST MOD 30 MIN: CPT | Performed by: INTERNAL MEDICINE

## 2024-04-17 PROCEDURE — 3077F SYST BP >= 140 MM HG: CPT | Performed by: INTERNAL MEDICINE

## 2024-04-17 PROCEDURE — 1157F ADVNC CARE PLAN IN RCRD: CPT | Performed by: INTERNAL MEDICINE

## 2024-04-17 PROCEDURE — 3078F DIAST BP <80 MM HG: CPT | Performed by: INTERNAL MEDICINE

## 2024-04-17 PROCEDURE — 1123F ACP DISCUSS/DSCN MKR DOCD: CPT | Performed by: INTERNAL MEDICINE

## 2024-04-17 NOTE — PROGRESS NOTES
Recheck  OA  /  PMR  currently off prednisone and doing well     HPI - she is going to PT for her L knee, which helps.  Occ R knee pain.  No other pain.  Hands sl swollen and stiff in AM that resolves once she moves around about 15 min.  No CP, resp, or GI.  No HA, Tongue/jaw john, or visual changes    PE  NAD  Good TA pulses, NT, no head/neck bruits  RRR no r/m/g  CTA  No edema  No synovitis  NT and no pain with ROM throughout    A/P - OA and PMR in remission off pred - knee pain but improved with PT and doesn't think bad enough for injection  OP - on ibandronate - recheck DEXA 7/25 for poss drug holiday  Reeval 1 yr or sooner PRN

## 2024-04-29 ENCOUNTER — TREATMENT (OUTPATIENT)
Dept: PHYSICAL THERAPY | Facility: CLINIC | Age: 74
End: 2024-04-29
Payer: MEDICARE

## 2024-04-29 DIAGNOSIS — M25.562 LEFT KNEE PAIN, UNSPECIFIED CHRONICITY: ICD-10-CM

## 2024-04-29 PROCEDURE — 97530 THERAPEUTIC ACTIVITIES: CPT | Mod: GP | Performed by: PHYSICAL THERAPIST

## 2024-04-29 ASSESSMENT — PAIN SCALES - GENERAL: PAINLEVEL_OUTOF10: 0 - NO PAIN

## 2024-04-29 ASSESSMENT — PAIN - FUNCTIONAL ASSESSMENT: PAIN_FUNCTIONAL_ASSESSMENT: 0-10

## 2024-04-29 NOTE — PROGRESS NOTES
Physical Therapy Progress Note/Treatment    Patient Name: Nahomi Velasquez  MRN: [id number]  Today's Date: 4/29/2024  Time Calculation  Start Time: 0845  Stop Time: 0923  Time Calculation (min): 38 min  PT Therapeutic Procedures Time Entry  Therapeutic Activity Time Entry: 38    Insurance:  Visit number: 13 of 20  Authorization info:  [place] auth. [place]40 [place] until OOP is met.   Insurance Type: Payor: [employer] MEDICARE / Plan: A[employer] MEDICARE ASSURE / Product Type: *No Product type* /     Current Problem   1. Left knee pain, unspecified chronicity  Follow Up In Physical Therapy    Follow Up In Physical Therapy          Subjective   General   General Comment: Pt has been out of therpay for almost a month, she went to her MD who told her that she doesn't need to be seen by their office for a year. She did not want to get another cortisone shot. She has not been very compliant with the HEP. She doesn't report any large problems with the knee. She was able to get out of the car without any problem. She would like to continue with therapy as she feels it keeps her honest with the exercises.  Precautions:  Precautions  Precautions Comment: none  Pain   Pain Assessment: 0-10  Pain Score: 0 - No pain  Post Treatment Pain Level 0    Objective   L knee ROM -3-125  L LE MMT: 5/5 except L hip abd and ER 4/5   R hip MMT 5/5  Stair negotiation: non-reciprocal pattern preferred, instructed on reciprocal with noted hesitation and reports of discomfort  Gait: WNL  Transfer sit <> stand: noted unequal WB throughout B LE appeared to have slight shift toward L side compared to R     Prior x-ray (11/28/23)  IMPRESSION:  Advanced tricompartmental osteoarthritis right knee worst laterally.  Mild tricompartmental osteoarthritis left knee.    LEFS: 64/80    Treatments:    Therapeutic activity:  Therapeutic Activity  Therapeutic Activity Performed: Yes  Therapeutic Activity 1: Re-assessment this date see objective measures  Therapeutic Activity  2: Educated on importance of maintaining HEP  - Sit to Stand Without Arm Support  3 sets - 10 reps  - Standing Hip Abduction with Resistance at Ankles and Unilateral Counter Support  - 3 sets - 10 reps  - Lateral Step Up  -3 sets - 10 reps    Assessment   Assessment:   PT Assessment  Assessment Comment: Pt has demonstrated improved subjective questionairre however remains to have deficits with hip abductor strength creating incresed strain through L knee structures. Discussed findings with patient as well as importance of performing HEP and decided to progress current POC to focus on standing stabilization, pt verbalized understanding.    Plan:    1x/week for 4 weeks  Biweekly following for another 3-4 sessions    OP EDUCATION:   Updated HEP : Access Code [email or web address]    Goals:   Active       PT Problem       Pt will be 100% IND with HEP in 4 weeks in order to maintain progress with therapy.   (Met)       Start:  12/04/23    Expected End:  01/03/24    Resolved:  02/28/24         Pt will demosntrated normalized eccentric control during descending stair negotiation in 4 weeks for home ambulation needs. (Met)       Start:  12/04/23    Expected End:  01/03/24    Resolved:  03/20/24         Pt will demonstrate subjective improvement of ADLs and recreational activities through improved score of 70 on LEFS in 4 weeks for functional activities at home..  (Progressing)       Start:  12/04/23    Expected End:  06/28/24               PT Problem       Pt will be able to perform ascending/descending reciprocal pattern of 10 steps in 6 weeks for home navigation needs.        Start:  04/29/24    Expected End:  06/28/24            Pt will be able to perform car transfers without any deficits in 6 weeks for transportation needs       Start:  04/29/24    Expected End:  06/28/24             Other Measures  Lower Extremity Funtional Score (LEFS): 64

## 2024-05-09 ENCOUNTER — TREATMENT (OUTPATIENT)
Dept: PHYSICAL THERAPY | Facility: CLINIC | Age: 74
End: 2024-05-09
Payer: MEDICARE

## 2024-05-09 DIAGNOSIS — M25.562 LEFT KNEE PAIN, UNSPECIFIED CHRONICITY: ICD-10-CM

## 2024-05-09 PROCEDURE — 97110 THERAPEUTIC EXERCISES: CPT | Mod: GP,CQ

## 2024-05-09 NOTE — PROGRESS NOTES
"Physical Therapy Treatment    Patient Name: Nahomi Velasquez  MRN: [id number]  Today's Date: 5/9/2024  Time Calculation  Start Time: 1325  Stop Time: 1405  Time Calculation (min): 40 min  PT Therapeutic Procedures Time Entry  Therapeutic Exercise Time Entry: 40    Insurance:  Visit number: 14 of 20  [place] info: [place]   [place] Type: Payor: T MEDICARE / Plan: AETNA MEDICARE ASSURE / Product Type: *No Product type* /     Current Problem   1. Left knee pain, unspecified chronicity  Follow Up In Physical Therapy          Subjective   General    Pt reports infrapatellar discomfort upon arrival.  Precautions:     Pain    6  Post Treatment Pain Level 2    Objective   Minimal discomfort in L knee with stair negotiation    Treatments:  Therapeutic Exercise:   Nu-step x 7' L5  Touchdowns 2 x 15 4 \" step  STS with 1 cushion  2 x 15  Side stepping with red band 6 x 20'  Walking lunges 6 x 20'  Towel slides L 2 x 10  ELP 3 x 10 #110  HS curls #40 2 x 15            Assessment   Assessment:    Pt seems to get pain with certain movements one time and no pain other times with the same movements.    Plan:    Progress strengthening per POC    OP EDUCATION:       Goals:   Active       PT Problem       Pt will be 100% IND with HEP in 4 weeks in order to maintain progress with therapy.   (Met)       Start:  12/04/23    Expected End:  01/03/24    Resolved:  02/28/24         Pt will demosntrated normalized eccentric control during descending stair negotiation in 4 weeks for home ambulation needs. (Met)       Start:  12/04/23    Expected End:  01/03/24    Resolved:  03/20/24         Pt will demonstrate subjective improvement of ADLs and recreational activities through improved score of 70 on LEFS in 4 weeks for functional activities at home..  (Progressing)       Start:  12/04/23    Expected End:  06/28/24               PT Problem       Pt will be able to perform ascending/descending reciprocal pattern of 10 steps in 6 weeks for home " navigation needs.        Start:  04/29/24    Expected End:  06/28/24            Pt will be able to perform car transfers without any deficits in 6 weeks for transportation needs       Start:  04/29/24    Expected End:  06/28/24

## 2024-05-15 ENCOUNTER — TREATMENT (OUTPATIENT)
Dept: PHYSICAL THERAPY | Facility: CLINIC | Age: 74
End: 2024-05-15
Payer: MEDICARE

## 2024-05-15 DIAGNOSIS — M25.562 LEFT KNEE PAIN, UNSPECIFIED CHRONICITY: ICD-10-CM

## 2024-05-15 PROCEDURE — 97110 THERAPEUTIC EXERCISES: CPT | Mod: GP,CQ

## 2024-05-15 NOTE — PROGRESS NOTES
"Physical Therapy Treatment    Patient Name: Nahomi Velasquez  MRN: [id number]  Today's Date: 5/15/2024  Time Calculation  Start Time: 1015  Stop Time: 1045  Time Calculation (min): 30 min  PT Therapeutic Procedures Time Entry  Therapeutic Exercise Time Entry: 30    Insurance:  Visit number: 15 of 20  [place] info[place]  [place] Type: Payor: T MEDICARE / Plan: AETNA MEDICARE ASSURE / Product Type: *No Product type* /     Current Problem   1. Left knee pain, unspecified chronicity  Follow Up In Physical Therapy          Subjective   General    Pt reports that she did 6 hours of yard work with no real issues with her L knee.  Precautions:     Pain    2  Post Treatment Pain Level 0    Objective   L knee ROM WNL  Good eccentric control through L LE    Treatments:  Therapeutic Exercise:   Nu-step x 7' L5  Touchdowns 2 x 15 4 \" step  STS with 1 cushion  3 x 10 eccentric focus  Side stepping with red band 6 x 20'  Walking lunges 6 x 20'  Towel slides L 2 x 15  ELP 3 x 10 #120  HS curls #50 3 x 10      Assessment   Assessment:      Pt tolerated session well. Focused on eccentric control  Plan:    Progress LE strengthening as able.    OP EDUCATION:   Added Towel slides for HEP    Goals:   Active       PT Problem       Pt will be 100% IND with HEP in 4 weeks in order to maintain progress with therapy.   (Met)       Start:  12/04/23    Expected End:  01/03/24    Resolved:  02/28/24         Pt will demosntrated normalized eccentric control during descending stair negotiation in 4 weeks for home ambulation needs. (Met)       Start:  12/04/23    Expected End:  01/03/24    Resolved:  03/20/24         Pt will demonstrate subjective improvement of ADLs and recreational activities through improved score of 70 on LEFS in 4 weeks for functional activities at home..  (Progressing)       Start:  12/04/23    Expected End:  06/28/24               PT Problem       Pt will be able to perform ascending/descending reciprocal pattern of 10 " steps in 6 weeks for home navigation needs.  (Progressing)       Start:  04/29/24    Expected End:  06/28/24            Pt will be able to perform car transfers without any deficits in 6 weeks for transportation needs (Progressing)       Start:  04/29/24    Expected End:  06/28/24

## 2024-05-23 ENCOUNTER — TREATMENT (OUTPATIENT)
Dept: PHYSICAL THERAPY | Facility: CLINIC | Age: 74
End: 2024-05-23
Payer: MEDICARE

## 2024-05-23 DIAGNOSIS — M25.562 LEFT KNEE PAIN, UNSPECIFIED CHRONICITY: ICD-10-CM

## 2024-05-23 PROCEDURE — 97110 THERAPEUTIC EXERCISES: CPT | Mod: GP | Performed by: PHYSICAL THERAPIST

## 2024-05-23 ASSESSMENT — PAIN SCALES - GENERAL: PAINLEVEL_OUTOF10: 0 - NO PAIN

## 2024-05-23 ASSESSMENT — PAIN - FUNCTIONAL ASSESSMENT: PAIN_FUNCTIONAL_ASSESSMENT: 0-10

## 2024-05-23 NOTE — PROGRESS NOTES
Physical Therapy Treatment    Patient Name: Nahomi Velasquez  MRN: [id number]  Today's Date: 5/23/2024  Time Calculation  Start Time: 0850  Stop Time: 0919  Time Calculation (min): 29 min  PT Therapeutic Procedures Time Entry  Therapeutic Exercise Time Entry: 29    Insurance:  Visit number: 16 of 20  Authorization info: [place] auth[place]40 [place] until OOP is met.   Insurance Type: Payor: AETNA MEDICARE / Plan: AETNA MEDICARE ASSURE / Product Type: *No Product type* /     Current Problem   1. Left knee pain, unspecified chronicity  Follow Up In Physical Therapy          Subjective   General   General Comment: Pt reports she feels pretty good, she did more yard work without problem.  Precautions:  Precautions  Precautions Comment: none  Pain   Pain Assessment: 0-10  Pain Score: 0 - No pain  Post Treatment Pain Level 0    Objective   Slight hip abductor weakness  Hip/knee disassociation     Treatments:  Therapeutic Exercise:  Therapeutic Exercise  Therapeutic Exercise Performed: Yes  Therapeutic Exercise Activity 1: NuStep L6 6'  Therapeutic Exercise Activity 2: calf raise to eccentric lower at stairs 10x3  Therapeutic Exercise Activity 3: lateral steps yellow loop @ knees 3 laps  Therapeutic Exercise Activity 4: mini squat with resistance yellow loop 10x3  Therapeutic Exercise Activity 5: leg press #120 10x3  Therapeutic Exercise Activity 6: HS curl #50 10x3      Assessment   Assessment:   PT Assessment  Assessment Comment: Pt demonstrated incresaed fatigue this date with SOB, discussed and decided to have shorter session, discussed rests with higher heat and yard work.    Plan:    Re-assess next session     OP EDUCATION:   Continue with established HEP     Goals:   Active       PT Problem       Pt will be 100% IND with HEP in 4 weeks in order to maintain progress with therapy.   (Met)       Start:  12/04/23    Expected End:  01/03/24    Resolved:  02/28/24         Pt will demosntrated normalized eccentric control  during descending stair negotiation in 4 weeks for home ambulation needs. (Met)       Start:  12/04/23    Expected End:  01/03/24    Resolved:  03/20/24         Pt will demonstrate subjective improvement of ADLs and recreational activities through improved score of 70 on LEFS in 4 weeks for functional activities at home..  (Progressing)       Start:  12/04/23    Expected End:  06/28/24               PT Problem       Pt will be able to perform ascending/descending reciprocal pattern of 10 steps in 6 weeks for home navigation needs.  (Progressing)       Start:  04/29/24    Expected End:  06/28/24            Pt will be able to perform car transfers without any deficits in 6 weeks for transportation needs (Progressing)       Start:  04/29/24    Expected End:  06/28/24

## 2024-05-24 DIAGNOSIS — M81.0 OSTEOPOROSIS, UNSPECIFIED OSTEOPOROSIS TYPE, UNSPECIFIED PATHOLOGICAL FRACTURE PRESENCE: ICD-10-CM

## 2024-05-24 RX ORDER — IBANDRONATE SODIUM 150 MG/1
150 TABLET, FILM COATED ORAL
Qty: 3 TABLET | Refills: 1 | Status: SHIPPED | OUTPATIENT
Start: 2024-05-24

## 2024-05-30 ENCOUNTER — TREATMENT (OUTPATIENT)
Dept: PHYSICAL THERAPY | Facility: CLINIC | Age: 74
End: 2024-05-30
Payer: MEDICARE

## 2024-05-30 DIAGNOSIS — M25.562 LEFT KNEE PAIN, UNSPECIFIED CHRONICITY: ICD-10-CM

## 2024-05-30 PROCEDURE — 97110 THERAPEUTIC EXERCISES: CPT | Mod: GP | Performed by: PHYSICAL THERAPIST

## 2024-05-30 PROCEDURE — 97530 THERAPEUTIC ACTIVITIES: CPT | Mod: GP | Performed by: PHYSICAL THERAPIST

## 2024-05-30 ASSESSMENT — PAIN SCALES - GENERAL: PAINLEVEL_OUTOF10: 0 - NO PAIN

## 2024-05-30 ASSESSMENT — PAIN - FUNCTIONAL ASSESSMENT: PAIN_FUNCTIONAL_ASSESSMENT: 0-10

## 2024-05-30 NOTE — PROGRESS NOTES
Physical Therapy Discharge/Treatment    Patient Name: Nahomi Velasquez  MRN: [id number]  Today's Date: 5/30/2024  Time Calculation  Start Time: 0945  Stop Time: 1018  Time Calculation (min): 33 min  PT Therapeutic Procedures Time Entry  Therapeutic Exercise Time Entry: 13  Therapeutic Activity Time Entry: 20    Insurance:  Visit number: 17 of 20  Authorization info: [place] auth[place]40 [place] until OOP is met.   Insurance Type: Payor: T MEDICARE / Plan: AETNA MEDICARE ASSURE / Product Type: *No Product type* /     Current Problem   1. Left knee pain, unspecified chronicity  Follow Up In Physical Therapy          Subjective   General   General Comment: Pt reports increased soreness after last session otherwise no new compliants.  Precautions:  Precautions  Precautions Comment: none  Pain   Pain Assessment: 0-10  Pain Score: 0 - No pain  Post Treatment Pain Level 0    Objective   LEFS 69  Stair negotiation: reciprocal ascending/descending no UE A  Knee ROM WNL and equal B  B LE 5/5  Sit to stand: good equal weight bearing with concentric/eccentric contorl     Treatments:  Therapeutic Exercise:  Therapeutic Exercise  Therapeutic Exercise Performed: Yes  Therapeutic Exercise Activity 1: NuStep L6 6'  Therapeutic Exercise Activity 2: Hamstring curl #30 10x3  Therapeutic Exercise Activity 3: B leg press #100 10x3  Therapeutic activity:  Therapeutic Activity  Therapeutic Activity Performed: Yes  Therapeutic Activity 1: Re-assessment this date  Therapeutic Activity 2: educated on exercise vs over doing activity with risk of injury      Assessment   Assessment:   PT Assessment  Assessment Comment: Pt has made good progress since the start of therapy with full ROM and strength achieved and functional strength present without any large deficits. Pt provided with extensive HEP throughout care, appropriate for discharge this date.    Plan:    discharge    OP EDUCATION:   Continue with established HEP     Goals:   Resolved        PT Problem       Pt will be 100% IND with HEP in 4 weeks in order to maintain progress with therapy.   (Met)       Start:  12/04/23    Expected End:  01/03/24    Resolved:  02/28/24         Pt will demosntrated normalized eccentric control during descending stair negotiation in 4 weeks for home ambulation needs. (Met)       Start:  12/04/23    Expected End:  01/03/24    Resolved:  03/20/24         Pt will demonstrate subjective improvement of ADLs and recreational activities through improved score of 70 on LEFS in 4 weeks for functional activities at home..  (Met)       Start:  12/04/23    Expected End:  06/28/24    Resolved:  05/30/24            PT Problem       Pt will be able to perform ascending/descending reciprocal pattern of 10 steps in 6 weeks for home navigation needs.  (Met)       Start:  04/29/24    Expected End:  06/28/24    Resolved:  05/30/24         Pt will be able to perform car transfers without any deficits in 6 weeks for transportation needs (Met)       Start:  04/29/24    Expected End:  06/28/24    Resolved:  05/30/24          Other Measures  Lower Extremity Funtional Score (LEFS): 69

## 2024-08-01 ENCOUNTER — TRANSCRIBE ORDERS (OUTPATIENT)
Dept: OBSTETRICS AND GYNECOLOGY | Facility: CLINIC | Age: 74
End: 2024-08-01
Payer: MEDICARE

## 2024-08-01 DIAGNOSIS — Z12.31 BREAST CANCER SCREENING BY MAMMOGRAM: Primary | ICD-10-CM

## 2024-08-20 ENCOUNTER — APPOINTMENT (OUTPATIENT)
Dept: PRIMARY CARE | Facility: CLINIC | Age: 74
End: 2024-08-20
Payer: MEDICARE

## 2024-08-20 ENCOUNTER — LAB (OUTPATIENT)
Dept: LAB | Facility: LAB | Age: 74
End: 2024-08-20
Payer: MEDICARE

## 2024-08-20 VITALS
RESPIRATION RATE: 14 BRPM | WEIGHT: 133 LBS | SYSTOLIC BLOOD PRESSURE: 144 MMHG | HEIGHT: 66 IN | HEART RATE: 68 BPM | BODY MASS INDEX: 21.38 KG/M2 | DIASTOLIC BLOOD PRESSURE: 80 MMHG

## 2024-08-20 DIAGNOSIS — Z92.29 IMMUNIZATIONS REVIEWED AND UP TO DATE: ICD-10-CM

## 2024-08-20 DIAGNOSIS — Z00.00 ROUTINE GENERAL MEDICAL EXAMINATION AT A HEALTH CARE FACILITY: Primary | ICD-10-CM

## 2024-08-20 DIAGNOSIS — D72.819 LEUKOPENIA, UNSPECIFIED TYPE: ICD-10-CM

## 2024-08-20 DIAGNOSIS — I10 SYSTOLIC HYPERTENSION, ISOLATED: ICD-10-CM

## 2024-08-20 DIAGNOSIS — Z00.00 ROUTINE GENERAL MEDICAL EXAMINATION AT A HEALTH CARE FACILITY: ICD-10-CM

## 2024-08-20 DIAGNOSIS — R09.89 FEMORAL BRUIT: ICD-10-CM

## 2024-08-20 DIAGNOSIS — M35.3 PMR (POLYMYALGIA RHEUMATICA) (MULTI): ICD-10-CM

## 2024-08-20 DIAGNOSIS — M89.9 DISORDER OF BONE AND CARTILAGE: ICD-10-CM

## 2024-08-20 DIAGNOSIS — M94.9 DISORDER OF BONE AND CARTILAGE: ICD-10-CM

## 2024-08-20 DIAGNOSIS — M81.0 OSTEOPOROSIS, UNSPECIFIED OSTEOPOROSIS TYPE, UNSPECIFIED PATHOLOGICAL FRACTURE PRESENCE: ICD-10-CM

## 2024-08-20 DIAGNOSIS — I73.9 PERIPHERAL VASCULAR DISEASE, UNSPECIFIED (CMS-HCC): ICD-10-CM

## 2024-08-20 DIAGNOSIS — E78.89 ELEVATED HIGH-DENSITY LIPOPROTEIN: ICD-10-CM

## 2024-08-20 DIAGNOSIS — G47.419 PRIMARY NARCOLEPSY WITHOUT CATAPLEXY (HHS-HCC): ICD-10-CM

## 2024-08-20 DIAGNOSIS — R09.89 BILATERAL CAROTID BRUITS: ICD-10-CM

## 2024-08-20 DIAGNOSIS — I25.10 CORONARY ARTERY DISEASE INVOLVING NATIVE CORONARY ARTERY OF NATIVE HEART WITHOUT ANGINA PECTORIS: ICD-10-CM

## 2024-08-20 DIAGNOSIS — R09.89 PALPABLE ABDOMINAL AORTA: ICD-10-CM

## 2024-08-20 LAB
ALBUMIN SERPL BCP-MCNC: 4.4 G/DL (ref 3.4–5)
ALT SERPL W P-5'-P-CCNC: 11 U/L (ref 7–45)
ANION GAP SERPL CALC-SCNC: 15 MMOL/L (ref 10–20)
APPEARANCE UR: ABNORMAL
AST SERPL W P-5'-P-CCNC: 13 U/L (ref 9–39)
BASOPHILS # BLD AUTO: 0.08 X10*3/UL (ref 0–0.1)
BASOPHILS NFR BLD AUTO: 1.1 %
BILIRUB UR STRIP.AUTO-MCNC: NEGATIVE MG/DL
BUN SERPL-MCNC: 18 MG/DL (ref 6–23)
CALCIUM SERPL-MCNC: 9.7 MG/DL (ref 8.6–10.6)
CHLORIDE SERPL-SCNC: 103 MMOL/L (ref 98–107)
CHOLEST SERPL-MCNC: 234 MG/DL (ref 0–199)
CO2 SERPL-SCNC: 29 MMOL/L (ref 21–32)
COLOR UR: YELLOW
CREAT SERPL-MCNC: 0.63 MG/DL (ref 0.5–1.05)
EGFRCR SERPLBLD CKD-EPI 2021: >90 ML/MIN/1.73M*2
EOSINOPHIL # BLD AUTO: 0.07 X10*3/UL (ref 0–0.4)
EOSINOPHIL NFR BLD AUTO: 1 %
ERYTHROCYTE [DISTWIDTH] IN BLOOD BY AUTOMATED COUNT: 12.6 % (ref 11.5–14.5)
GLUCOSE SERPL-MCNC: 100 MG/DL (ref 74–99)
GLUCOSE UR STRIP.AUTO-MCNC: NORMAL MG/DL
HCT VFR BLD AUTO: 41 % (ref 36–46)
HCV AB SER QL: NONREACTIVE
HDLC SERPL-MCNC: 64.5 MG/DL
HGB BLD-MCNC: 12.6 G/DL (ref 12–16)
IMM GRANULOCYTES # BLD AUTO: 0.01 X10*3/UL (ref 0–0.5)
IMM GRANULOCYTES NFR BLD AUTO: 0.1 % (ref 0–0.9)
KETONES UR STRIP.AUTO-MCNC: ABNORMAL MG/DL
LEUKOCYTE ESTERASE UR QL STRIP.AUTO: ABNORMAL
LYMPHOCYTES # BLD AUTO: 0.98 X10*3/UL (ref 0.8–3)
LYMPHOCYTES NFR BLD AUTO: 13.4 %
MCH RBC QN AUTO: 29.1 PG (ref 26–34)
MCHC RBC AUTO-ENTMCNC: 30.7 G/DL (ref 32–36)
MCV RBC AUTO: 95 FL (ref 80–100)
MONOCYTES # BLD AUTO: 0.72 X10*3/UL (ref 0.05–0.8)
MONOCYTES NFR BLD AUTO: 9.9 %
NEUTROPHILS # BLD AUTO: 5.43 X10*3/UL (ref 1.6–5.5)
NEUTROPHILS NFR BLD AUTO: 74.5 %
NITRITE UR QL STRIP.AUTO: NEGATIVE
NRBC BLD-RTO: 0 /100 WBCS (ref 0–0)
PH UR STRIP.AUTO: 5 [PH]
PHOSPHATE SERPL-MCNC: 3.5 MG/DL (ref 2.5–4.9)
PLATELET # BLD AUTO: 283 X10*3/UL (ref 150–450)
POTASSIUM SERPL-SCNC: 4.5 MMOL/L (ref 3.5–5.3)
PROT UR STRIP.AUTO-MCNC: NEGATIVE MG/DL
RBC # BLD AUTO: 4.33 X10*6/UL (ref 4–5.2)
RBC # UR STRIP.AUTO: NEGATIVE /UL
RBC #/AREA URNS AUTO: NORMAL /HPF
SODIUM SERPL-SCNC: 142 MMOL/L (ref 136–145)
SP GR UR STRIP.AUTO: 1.02
TSH SERPL-ACNC: 2.06 MIU/L (ref 0.44–3.98)
UROBILINOGEN UR STRIP.AUTO-MCNC: NORMAL MG/DL
WBC # BLD AUTO: 7.3 X10*3/UL (ref 4.4–11.3)
WBC #/AREA URNS AUTO: NORMAL /HPF

## 2024-08-20 PROCEDURE — 1157F ADVNC CARE PLAN IN RCRD: CPT | Performed by: INTERNAL MEDICINE

## 2024-08-20 PROCEDURE — 1160F RVW MEDS BY RX/DR IN RCRD: CPT | Performed by: INTERNAL MEDICINE

## 2024-08-20 PROCEDURE — 3079F DIAST BP 80-89 MM HG: CPT | Performed by: INTERNAL MEDICINE

## 2024-08-20 PROCEDURE — 1159F MED LIST DOCD IN RCRD: CPT | Performed by: INTERNAL MEDICINE

## 2024-08-20 PROCEDURE — 1123F ACP DISCUSS/DSCN MKR DOCD: CPT | Performed by: INTERNAL MEDICINE

## 2024-08-20 PROCEDURE — 84460 ALANINE AMINO (ALT) (SGPT): CPT

## 2024-08-20 PROCEDURE — 86803 HEPATITIS C AB TEST: CPT

## 2024-08-20 PROCEDURE — 1036F TOBACCO NON-USER: CPT | Performed by: INTERNAL MEDICINE

## 2024-08-20 PROCEDURE — 83718 ASSAY OF LIPOPROTEIN: CPT

## 2024-08-20 PROCEDURE — 1170F FXNL STATUS ASSESSED: CPT | Performed by: INTERNAL MEDICINE

## 2024-08-20 PROCEDURE — 80069 RENAL FUNCTION PANEL: CPT

## 2024-08-20 PROCEDURE — 85025 COMPLETE CBC W/AUTO DIFF WBC: CPT

## 2024-08-20 PROCEDURE — 99214 OFFICE O/P EST MOD 30 MIN: CPT | Performed by: INTERNAL MEDICINE

## 2024-08-20 PROCEDURE — 81001 URINALYSIS AUTO W/SCOPE: CPT

## 2024-08-20 PROCEDURE — 84443 ASSAY THYROID STIM HORMONE: CPT

## 2024-08-20 PROCEDURE — 82465 ASSAY BLD/SERUM CHOLESTEROL: CPT

## 2024-08-20 PROCEDURE — 36415 COLL VENOUS BLD VENIPUNCTURE: CPT

## 2024-08-20 PROCEDURE — 84450 TRANSFERASE (AST) (SGOT): CPT

## 2024-08-20 PROCEDURE — 3008F BODY MASS INDEX DOCD: CPT | Performed by: INTERNAL MEDICINE

## 2024-08-20 PROCEDURE — 3077F SYST BP >= 140 MM HG: CPT | Performed by: INTERNAL MEDICINE

## 2024-08-20 PROCEDURE — 1158F ADVNC CARE PLAN TLK DOCD: CPT | Performed by: INTERNAL MEDICINE

## 2024-08-20 PROCEDURE — 99397 PER PM REEVAL EST PAT 65+ YR: CPT | Performed by: INTERNAL MEDICINE

## 2024-08-20 PROCEDURE — 93000 ELECTROCARDIOGRAM COMPLETE: CPT | Performed by: INTERNAL MEDICINE

## 2024-08-20 PROCEDURE — G0439 PPPS, SUBSEQ VISIT: HCPCS | Performed by: INTERNAL MEDICINE

## 2024-08-20 NOTE — PROGRESS NOTES
Subjective   Reason for Visit: Nahomi Velasquez is an 73 y.o. female here for a Medicare Wellness visit.     Past Medical, Surgical, and Family History reviewed and updated in chart.    Reviewed all medications by prescribing practitioner or clinical pharmacist (such as prescriptions, OTCs, herbal therapies and supplements) and documented in the medical record.    HPI  Overall she feels well  No distinct health questions concerns or complaints  [facility] visit 2/14/2022    Patient Care Team:  Charly Aguiar MD as PCP - General (Internal Medicine)  Charly Aguiar MD as PCP - [employer] PCP     Review of Systems   Constitutional:  Negative for fatigue, fever and unexpected weight change.        130 pounds; narcolepsy   HENT:  Negative for dental problem, hearing loss, sore throat, tinnitus, trouble swallowing and voice change.         Dental up-to-date 2 times a year   Eyes: Negative.  Negative for visual disturbance.        Scheduled   Respiratory: Negative.  Negative for apnea, cough and shortness of breath.    Cardiovascular:  Negative for chest pain, palpitations and leg swelling.   Gastrointestinal:  Negative for abdominal pain, constipation, diarrhea, nausea and vomiting.   Endocrine: Negative.  Negative for polydipsia, polyphagia and polyuria.   Genitourinary: Negative.  Negative for dysuria, frequency, hematuria and urgency.   Musculoskeletal: Negative.  Negative for arthralgias, back pain and gait problem.   Skin: Negative.  Negative for rash and wound.        No concerns   Allergic/Immunologic: Negative for immunocompromised state.   Neurological:  Negative for dizziness, tremors, speech difficulty, weakness, light-headedness, numbness and headaches.   Hematological:  Does not bruise/bleed easily.   Psychiatric/Behavioral:  Negative for confusion, decreased concentration, dysphoric mood and sleep disturbance. The patient is not nervous/anxious.    No falls, no depression    Objective   Vitals:  BP  "144/80   Pulse 68   Resp 14   Ht 1.664 m (5' 5.5\")   Wt 60.3 kg (133 lb)   BMI 21.80 kg/m²       Physical Exam  Constitutional:       General: She is not in acute distress.     Appearance: Normal appearance.   HENT:      Head: Normocephalic.      Right Ear: Hearing and tympanic membrane normal.      Left Ear: Hearing and tympanic membrane normal.      Ears:      Comments: Speech and finger rub     Nose: Nose normal.      Mouth/Throat:      Mouth: Mucous membranes are moist.      Pharynx: Oropharynx is clear.      Comments: White plaque back of throat right side  Eyes:      General: No scleral icterus.     Extraocular Movements: Extraocular movements intact.      Conjunctiva/sclera: Conjunctivae normal.   Neck:      Thyroid: No thyromegaly.      Vascular: Carotid bruit present. No JVD.      Comments: Surgical scar, no thyromegaly or JVD, bilateral carotid bruit  Cardiovascular:      Rate and Rhythm: Normal rate and regular rhythm.      Pulses: Normal pulses.      Heart sounds: Normal heart sounds. No murmur heard.     Comments: Bilateral femoral bruits  Pulmonary:      Effort: Pulmonary effort is normal.      Breath sounds: Normal breath sounds. No wheezing, rhonchi or rales.   Chest:   Breasts:     Right: Normal.      Left: Normal.      Comments: Dense breasts  Abdominal:      General: Abdomen is flat. Bowel sounds are normal. There is no distension.      Palpations: Abdomen is soft. There is no mass.      Tenderness: There is no abdominal tenderness. There is no right CVA tenderness, left CVA tenderness or guarding.      Hernia: No hernia is present.      Comments: Palpable abdominal aorta with bruit   Genitourinary:     Comments: No pelvic or rectal exam  Musculoskeletal:         General: Normal range of motion.      Cervical back: Full passive range of motion without pain. No tenderness.      Right lower leg: No edema.      Left lower leg: No edema.      Comments: Joints F ROM, T0 L0 S0   Lymphadenopathy:    "   Cervical: No cervical adenopathy.   Skin:     General: Skin is warm.      Findings: No lesion or rash.      Comments: Benign nevi   Neurological:      General: No focal deficit present.      Mental Status: She is alert and oriented to person, place, and time.      Cranial Nerves: No cranial nerve deficit.      Sensory: No sensory deficit.      Motor: No weakness.      Coordination: Coordination normal.      Gait: Gait normal.      Deep Tendon Reflexes: Reflexes normal.      Comments: Right-hand-dominant   Psychiatric:         Mood and Affect: Mood normal.         Behavior: Behavior normal.         Thought Content: Thought content normal.     Data  Electrocardiogram 8/20/2024 normal sinus rhythm  Stress echocardiogram 7/10/2023  PVR/GINGER 3/4/2022  Lab 3/23/2023 , HDL-C 94, ratio 2.6, LDL-C 135  Bone densitometry 8/8/2022  Mammogram 10/25/2021  Coronary artery calcium CT score 5/3/2016-262 out of 284 in the LAD  Echocardiogram 4/27/2016 normal left ventricular systolic function  Colonoscopy 12/19/2014 diverticulosis  X-ray knees 11/28/2023  AAA abdominal aorta/iliac ultrasound-normal 4/22/2023    Assessment/Plan     Overall you are in good health today, age and gender appropriate wellness services reviewed  You have no clinical evidence of heart disease, and your cardiac risk factors are satisfactory  Cholesterol dominated by healthy HDL levels  Coronary artery calcifications present in 2016, normal stress echocardiogram 2023  Palpable aorta and vascular bruits noted with subsequent circulation studies abdomen and legs normal  Recommend carotid artery ultrasound  Age and gender appropriate cancer screening is up-to-date  Immunizations are up-to-date with anticipated seasonal flu shot and next COVID-19 vaccine  Bone densitometry is satisfactory  Narcolepsy doing well on current therapy  Recommend living will and DURABLE POWER OF  for healthcare  Problem List Items Addressed This Visit              ICD-10-CM    Carotid bruit R09.89    Relevant Orders    Vascular US carotid artery duplex bilateral    ECG 12 lead (Clinic Performed) (Completed)    Coronary artery disease without angina pectoris I25.10    Disorder of bone and cartilage M89.9, M94.9    Elevated high-density lipoprotein E78.89    Femoral bruit R09.89    Narcolepsy ([facility]) G47.419    Osteoporosis M81.0    Palpable abdominal aorta R09.89    PMR (polymyalgia rheumatica) (Multi) M35.3    Essential (primary) hypertension I10    Routine general medical examination at a [facility] care facility - Primary Z00.00    Relevant Orders    Hepatitis C Antibody (Completed)    Renal Function Panel (Completed)    TSH with reflex to Free T4 if abnormal (Completed)    CBC and Auto Differential (Completed)    Urinalysis with Reflex Microscopic (Completed)    Alanine Aminotransferase (Completed)    Aspartate Aminotransferase (Completed)    Cholesterol, total (Completed)    HDL cholesterol (Completed)    Immunizations reviewed and up to date Z92.29    Leukopenia D72.819    Relevant Orders    CBC and Auto Differential (Completed)

## 2024-08-20 NOTE — PATIENT INSTRUCTIONS
Overall you are in good health today, age and gender appropriate wellness services reviewed  You have no clinical evidence of heart disease, and your cardiac risk factors are satisfactory  Cholesterol dominated by healthy HDL levels  Coronary artery calcifications present in 2016, normal stress echocardiogram 2023  Palpable aorta and vascular bruits noted with subsequent circulation studies abdomen and legs normal  Recommend carotid artery ultrasound  Age and gender appropriate cancer screening is up-to-date  Immunizations are up-to-date with anticipated seasonal flu shot and next COVID-19 vaccine  Bone densitometry is satisfactory  Narcolepsy doing well on current therapy  Recommend living will and DURABLE POWER OF  for healthcare

## 2024-09-03 ENCOUNTER — HOSPITAL ENCOUNTER (OUTPATIENT)
Dept: RADIOLOGY | Facility: CLINIC | Age: 74
Discharge: HOME | End: 2024-09-03
Payer: MEDICARE

## 2024-09-03 DIAGNOSIS — R09.89 BILATERAL CAROTID BRUITS: ICD-10-CM

## 2024-09-03 PROBLEM — H04.123 DRY EYES: Status: ACTIVE | Noted: 2023-03-18

## 2024-09-03 PROBLEM — M85.80 OSTEOPENIA: Status: ACTIVE | Noted: 2024-09-03

## 2024-09-03 PROBLEM — K57.90 DIVERTICULAR DISEASE: Status: ACTIVE | Noted: 2024-09-03

## 2024-09-03 PROBLEM — S01.81XA FACIAL LACERATION: Status: RESOLVED | Noted: 2023-09-18 | Resolved: 2024-09-03

## 2024-09-03 PROBLEM — I10 ESSENTIAL (PRIMARY) HYPERTENSION: Status: ACTIVE | Noted: 2021-12-09

## 2024-09-03 PROBLEM — H43.819 POSTERIOR VITREOUS DETACHMENT: Status: ACTIVE | Noted: 2019-03-01

## 2024-09-03 PROBLEM — I73.9 PERIPHERAL VASCULAR DISEASE, UNSPECIFIED (CMS-HCC): Status: ACTIVE | Noted: 2024-09-03

## 2024-09-03 PROBLEM — E67.3 HYPERVITAMINOSIS D: Status: ACTIVE | Noted: 2022-02-16

## 2024-09-03 PROCEDURE — 93880 EXTRACRANIAL BILAT STUDY: CPT | Performed by: RADIOLOGY

## 2024-09-03 PROCEDURE — 93880 EXTRACRANIAL BILAT STUDY: CPT

## 2024-09-03 ASSESSMENT — ENCOUNTER SYMPTOMS
BACK PAIN: 0
COUGH: 0
BRUISES/BLEEDS EASILY: 0
WEAKNESS: 0
UNEXPECTED WEIGHT CHANGE: 0
CONFUSION: 0
SLEEP DISTURBANCE: 0
ABDOMINAL PAIN: 0
SPEECH DIFFICULTY: 0
FEVER: 0
NUMBNESS: 0
NAUSEA: 0
LIGHT-HEADEDNESS: 0
DYSURIA: 0
RESPIRATORY NEGATIVE: 1
ENDOCRINE NEGATIVE: 1
NERVOUS/ANXIOUS: 0
EYES NEGATIVE: 1
ARTHRALGIAS: 0
PALPITATIONS: 0
SHORTNESS OF BREATH: 0
POLYPHAGIA: 0
SORE THROAT: 0
DYSPHORIC MOOD: 0
CONSTIPATION: 0
APNEA: 0
MUSCULOSKELETAL NEGATIVE: 1
DECREASED CONCENTRATION: 0
DIZZINESS: 0
TROUBLE SWALLOWING: 0
ROS SKIN COMMENTS: NO CONCERNS
POLYDIPSIA: 0
HEADACHES: 0
HEMATURIA: 0
TREMORS: 0
DIARRHEA: 0
FATIGUE: 0
VOICE CHANGE: 0
WOUND: 0
VOMITING: 0
FREQUENCY: 0

## 2024-09-03 ASSESSMENT — PATIENT HEALTH QUESTIONNAIRE - PHQ9
SUM OF ALL RESPONSES TO PHQ9 QUESTIONS 1 AND 2: 0
SUM OF ALL RESPONSES TO PHQ9 QUESTIONS 1 AND 2: 0
1. LITTLE INTEREST OR PLEASURE IN DOING THINGS: NOT AT ALL
2. FEELING DOWN, DEPRESSED OR HOPELESS: NOT AT ALL
2. FEELING DOWN, DEPRESSED OR HOPELESS: NOT AT ALL
1. LITTLE INTEREST OR PLEASURE IN DOING THINGS: NOT AT ALL

## 2024-09-03 ASSESSMENT — ACTIVITIES OF DAILY LIVING (ADL)
DRESSING: INDEPENDENT
GROCERY_SHOPPING: INDEPENDENT
BATHING: INDEPENDENT
MANAGING_FINANCES: INDEPENDENT
TAKING_MEDICATION: INDEPENDENT
DOING_HOUSEWORK: INDEPENDENT

## 2024-10-25 ENCOUNTER — TELEPHONE (OUTPATIENT)
Dept: PRIMARY CARE | Facility: CLINIC | Age: 74
End: 2024-10-25

## 2024-10-28 ENCOUNTER — HOSPITAL ENCOUNTER (OUTPATIENT)
Dept: RADIOLOGY | Facility: HOSPITAL | Age: 74
Discharge: HOME | End: 2024-10-28
Payer: MEDICARE

## 2024-10-28 VITALS — WEIGHT: 135 LBS | HEIGHT: 66 IN | BODY MASS INDEX: 21.69 KG/M2

## 2024-10-28 DIAGNOSIS — Z12.31 BREAST CANCER SCREENING BY MAMMOGRAM: ICD-10-CM

## 2024-10-28 PROCEDURE — 77063 BREAST TOMOSYNTHESIS BI: CPT | Performed by: RADIOLOGY

## 2024-10-28 PROCEDURE — 77067 SCR MAMMO BI INCL CAD: CPT | Performed by: RADIOLOGY

## 2024-10-28 PROCEDURE — 77067 SCR MAMMO BI INCL CAD: CPT

## 2024-11-01 DIAGNOSIS — Z12.31 SCREENING MAMMOGRAM FOR BREAST CANCER: Primary | ICD-10-CM

## 2024-11-21 DIAGNOSIS — M81.0 OSTEOPOROSIS, UNSPECIFIED OSTEOPOROSIS TYPE, UNSPECIFIED PATHOLOGICAL FRACTURE PRESENCE: ICD-10-CM

## 2024-11-21 RX ORDER — IBANDRONATE SODIUM 150 MG/1
150 TABLET, FILM COATED ORAL
Qty: 3 TABLET | Refills: 1 | Status: SHIPPED | OUTPATIENT
Start: 2024-11-21

## 2025-04-16 ENCOUNTER — OFFICE VISIT (OUTPATIENT)
Dept: RHEUMATOLOGY | Facility: CLINIC | Age: 75
End: 2025-04-16
Payer: MEDICARE

## 2025-04-16 VITALS — SYSTOLIC BLOOD PRESSURE: 164 MMHG | WEIGHT: 139 LBS | BODY MASS INDEX: 22.44 KG/M2 | DIASTOLIC BLOOD PRESSURE: 66 MMHG

## 2025-04-16 DIAGNOSIS — M81.0 OSTEOPOROSIS, UNSPECIFIED OSTEOPOROSIS TYPE, UNSPECIFIED PATHOLOGICAL FRACTURE PRESENCE: ICD-10-CM

## 2025-04-16 DIAGNOSIS — M19.91 PRIMARY OSTEOARTHRITIS, UNSPECIFIED SITE: ICD-10-CM

## 2025-04-16 DIAGNOSIS — M35.3 PMR (POLYMYALGIA RHEUMATICA) (MULTI): Primary | ICD-10-CM

## 2025-04-16 PROCEDURE — 99214 OFFICE O/P EST MOD 30 MIN: CPT | Performed by: INTERNAL MEDICINE

## 2025-04-16 PROCEDURE — 3077F SYST BP >= 140 MM HG: CPT | Performed by: INTERNAL MEDICINE

## 2025-04-16 PROCEDURE — 3078F DIAST BP <80 MM HG: CPT | Performed by: INTERNAL MEDICINE

## 2025-04-16 PROCEDURE — 1157F ADVNC CARE PLAN IN RCRD: CPT | Performed by: INTERNAL MEDICINE

## 2025-04-16 PROCEDURE — 1123F ACP DISCUSS/DSCN MKR DOCD: CPT | Performed by: INTERNAL MEDICINE

## 2025-04-16 NOTE — PATIENT INSTRUCTIONS
You need a bone density test to be scheduled for July or August.  You will get a call from the  for your bone density test.  Call us if you have not gotten a call within the next 2 wks.    We will call you after the bone density test, and if you don't hear from us after a week, call the office.    Call at any time with questions/concerns

## 2025-04-16 NOTE — PROGRESS NOTES
"Recheck  OA  /  PMR  Doing well     HPI \"good good.\"  No partic pain.  Knees sometimes click but no pain.  No swelling.  AM stiffness hands until she moves around.  No CP, resp other than recent URI, or GI.  No HA, tongue/jaw john, or visual changes.  No fx since last OV.  She is taking Ca and has dairy.  [facility]      PE  NAD  Good TA pulses, NT, no head/neck bruits  RRR no r/m/g  CTA  2+ DP, PT, and rad pulses  No edema  No synovitis    A/P - OP on ibandronate close to 5 yrs - will check DEXA this summer for possible drug holiday  OA - minimal sx  H/o PMR - remains in remission off pred  Follow up based on above  "

## 2025-05-30 ENCOUNTER — TELEPHONE (OUTPATIENT)
Dept: PRIMARY CARE | Facility: CLINIC | Age: 75
End: 2025-05-30

## 2025-06-12 DIAGNOSIS — M81.0 OSTEOPOROSIS, UNSPECIFIED OSTEOPOROSIS TYPE, UNSPECIFIED PATHOLOGICAL FRACTURE PRESENCE: ICD-10-CM

## 2025-06-12 RX ORDER — IBANDRONATE SODIUM 150 MG/1
150 TABLET, FILM COATED ORAL
Qty: 3 TABLET | Refills: 0 | Status: SHIPPED | OUTPATIENT
Start: 2025-06-12

## 2025-07-10 ENCOUNTER — HOSPITAL ENCOUNTER (OUTPATIENT)
Dept: RADIOLOGY | Facility: CLINIC | Age: 75
Discharge: HOME | End: 2025-07-10
Payer: MEDICARE

## 2025-07-10 DIAGNOSIS — M81.0 OSTEOPOROSIS, UNSPECIFIED OSTEOPOROSIS TYPE, UNSPECIFIED PATHOLOGICAL FRACTURE PRESENCE: ICD-10-CM

## 2025-07-10 PROCEDURE — 77080 DXA BONE DENSITY AXIAL: CPT

## 2025-07-18 ENCOUNTER — TELEPHONE (OUTPATIENT)
Dept: RHEUMATOLOGY | Facility: CLINIC | Age: 75
End: 2025-07-18
Payer: MEDICARE

## 2025-07-18 NOTE — TELEPHONE ENCOUNTER
Patient left a voicemail per advisement following up 1 week after getting her bone density test done and wanting to know what the test shows, if anything. Please advise.

## 2025-07-30 ENCOUNTER — TELEPHONE (OUTPATIENT)
Dept: PRIMARY CARE | Facility: CLINIC | Age: 75
End: 2025-07-30

## 2025-08-20 ENCOUNTER — APPOINTMENT (OUTPATIENT)
Dept: PRIMARY CARE | Facility: CLINIC | Age: 75
End: 2025-08-20
Payer: MEDICARE

## 2025-08-31 PROBLEM — R00.1 BRADYCARDIA, SINUS: Status: ACTIVE | Noted: 2025-08-31

## 2026-08-24 ENCOUNTER — APPOINTMENT (OUTPATIENT)
Dept: PRIMARY CARE | Facility: CLINIC | Age: 76
End: 2026-08-24
Payer: MEDICARE